# Patient Record
Sex: MALE | Race: WHITE | Employment: UNEMPLOYED | RURAL
[De-identification: names, ages, dates, MRNs, and addresses within clinical notes are randomized per-mention and may not be internally consistent; named-entity substitution may affect disease eponyms.]

---

## 2017-01-01 ENCOUNTER — OFFICE VISIT (OUTPATIENT)
Dept: CARDIOLOGY CLINIC | Age: 77
End: 2017-01-01

## 2017-01-01 ENCOUNTER — CLINICAL SUPPORT (OUTPATIENT)
Dept: CARDIOLOGY CLINIC | Age: 77
End: 2017-01-01

## 2017-01-01 VITALS
HEIGHT: 69 IN | RESPIRATION RATE: 18 BRPM | OXYGEN SATURATION: 99 % | SYSTOLIC BLOOD PRESSURE: 136 MMHG | DIASTOLIC BLOOD PRESSURE: 70 MMHG | HEART RATE: 75 BPM

## 2017-01-01 DIAGNOSIS — Z95.0 S/P CARDIAC PACEMAKER PROCEDURE: ICD-10-CM

## 2017-01-01 DIAGNOSIS — I48.19 PERSISTENT ATRIAL FIBRILLATION (HCC): ICD-10-CM

## 2017-01-01 DIAGNOSIS — Z95.0 S/P CARDIAC PACEMAKER PROCEDURE: Primary | ICD-10-CM

## 2017-01-01 DIAGNOSIS — I44.1 AV BLOCK, 2ND DEGREE: ICD-10-CM

## 2017-01-01 DIAGNOSIS — G71.01 BECKER'S MUSCULAR DYSTROPHY (HCC): ICD-10-CM

## 2017-01-01 DIAGNOSIS — I49.5 SICK SINUS SYNDROME (HCC): Primary | ICD-10-CM

## 2017-01-01 RX ORDER — SILVER SULFADIAZINE 10 G/1000G
CREAM TOPICAL AS NEEDED
COMMUNITY
End: 2018-01-01 | Stop reason: CLARIF

## 2017-01-01 RX ORDER — FACIAL-BODY WIPES
10 EACH TOPICAL AS NEEDED
COMMUNITY

## 2017-01-01 RX ORDER — POTASSIUM CHLORIDE 750 MG/1
10 CAPSULE, EXTENDED RELEASE ORAL DAILY
COMMUNITY

## 2017-01-01 RX ORDER — BISACODYL 5 MG
5 TABLET, DELAYED RELEASE (ENTERIC COATED) ORAL DAILY PRN
COMMUNITY

## 2017-01-01 RX ORDER — ADHESIVE BANDAGE
30 BANDAGE TOPICAL DAILY PRN
COMMUNITY

## 2017-01-01 RX ORDER — CHLORHEXIDINE GLUCONATE 1.2 MG/ML
15 RINSE ORAL DAILY
COMMUNITY

## 2017-01-01 RX ORDER — POLYETHYLENE GLYCOL 3350
POWDER (GRAM) MISCELLANEOUS AS NEEDED
COMMUNITY

## 2017-04-21 ENCOUNTER — OFFICE VISIT (OUTPATIENT)
Dept: CARDIOLOGY CLINIC | Age: 77
End: 2017-04-21

## 2017-04-21 ENCOUNTER — CLINICAL SUPPORT (OUTPATIENT)
Dept: CARDIOLOGY CLINIC | Age: 77
End: 2017-04-21

## 2017-04-21 VITALS
WEIGHT: 143 LBS | HEIGHT: 69 IN | OXYGEN SATURATION: 95 % | DIASTOLIC BLOOD PRESSURE: 70 MMHG | RESPIRATION RATE: 16 BRPM | BODY MASS INDEX: 21.18 KG/M2 | SYSTOLIC BLOOD PRESSURE: 118 MMHG | HEART RATE: 70 BPM

## 2017-04-21 DIAGNOSIS — R55 SYNCOPE AND COLLAPSE: ICD-10-CM

## 2017-04-21 DIAGNOSIS — I44.1 AV BLOCK, 2ND DEGREE: ICD-10-CM

## 2017-04-21 DIAGNOSIS — Z95.0 S/P CARDIAC PACEMAKER PROCEDURE: Primary | ICD-10-CM

## 2017-04-21 DIAGNOSIS — I48.19 PERSISTENT ATRIAL FIBRILLATION (HCC): ICD-10-CM

## 2017-04-21 DIAGNOSIS — G71.01 BECKER'S MUSCULAR DYSTROPHY (HCC): Primary | ICD-10-CM

## 2017-04-21 DIAGNOSIS — Z95.0 S/P CARDIAC PACEMAKER PROCEDURE: ICD-10-CM

## 2017-04-21 DIAGNOSIS — I49.5 SICK SINUS SYNDROME (HCC): ICD-10-CM

## 2017-04-21 RX ORDER — GUAIFENESIN 600 MG/1
600 TABLET, EXTENDED RELEASE ORAL 2 TIMES DAILY
COMMUNITY
End: 2017-01-01 | Stop reason: ALTCHOICE

## 2017-04-21 RX ORDER — ALBUTEROL SULFATE 90 UG/1
AEROSOL, METERED RESPIRATORY (INHALATION) 2 TIMES DAILY
COMMUNITY

## 2017-04-21 RX ORDER — DOCUSATE SODIUM 100 MG/1
100 CAPSULE, LIQUID FILLED ORAL AS NEEDED
COMMUNITY

## 2017-04-21 NOTE — PROGRESS NOTES
Verified patient with two patient identifiers. Medications reviewed/approved by Dr. Iza Boudreaux. Verbal from Dr. Iza Boudreaux to remove the medications that were deleted during the visit.

## 2017-04-21 NOTE — PROGRESS NOTES
Mi Mora. is a 77 y.o. male is here for routine f/u and PPM.  No specific CV sx or complaints. PPM check today with persistent afib since the fall. Had seen Dr. Audie Martini in Sept and with his MD and Carly 1, frequent falls he is not on anticoag. The patient denies chest pain/ shortness of breath, orthopnea, PND, LE edema, palpitations, syncope, presyncope or fatigue. 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 No Known Allergies   No family history on file. Social History     Social History    Marital status: SINGLE     Spouse name: N/A    Number of children: N/A    Years of education: N/A     Occupational History    Not on file. Social History Main Topics    Smoking status: Current Every Day Smoker     Types: Cigarettes    Smokeless tobacco: Never Used    Alcohol use No    Drug use: Not on file    Sexual activity: Not on file     Other Topics Concern    Not on file     Social History Narrative      Current Outpatient Prescriptions   Medication Sig    ADVAIR DISKUS 250-50 mcg/dose diskus inhaler Take 2 Inhalation by inhalation two (2) times a day.  ATROVENT HFA 17 mcg/actuation inhaler Take 2 Puffs by inhalation three (3) times daily.  predniSONE (DELTASONE) 10 mg tablet Take 10 mg by mouth daily.  furosemide (LASIX) 40 mg tablet Take 40 mg by mouth every three (3) days.  methocarbamol (ROBAXIN) 500 mg tablet Take  by mouth three (3) times daily as needed.     ALBUTEROL SULFATE IN Take  by inhalation. 2.5 MG/3 ML NEBULIZER AS NEEDED    cyclobenzaprine (FLEXERIL) 5 mg tablet Take 10 mg by mouth nightly. & AS NEEDED BID    cetirizine (ZYRTEC) 10 mg tablet Take  by mouth.  calcium-cholecalciferol, D3, (CALTRATE 600+D) tablet Take 1 Tab by mouth daily.  MAPAP, ACETAMINOPHEN, PO Take 650 mg by mouth as needed.  aspirin (ASPIRIN) 325 mg tablet Take 325 mg by mouth daily.  tamsulosin (FLOMAX) 0.4 mg capsule Take 0.4 mg by mouth daily. No current facility-administered medications for this visit. Review of Symptoms:    CONST  No weight change. No fever, chills, sweats    ENT No visual changes, URI sx, sore throat    CV  See HPI   RESP  No cough, or sputum, wheezing. Also see HPI   GI  No abdominal pain or change in bowel habits. No heartburn or dysphagia. No melena or rectal bleeding.   No dysuria, urgency, frequency, hematuria   MSKEL  No joint pain, swelling. No muscle pain. SKIN  No rash or lesions. NEURO  No headache, syncope, or seizure. No weakness, loss of sensation, or paresthesias. PSYCH  No low mood or depression  No anxiety. HE/LYMPH  No easy bruising, abnormal bleeding, or enlarged glands.         Physical ExamPhysical Exam:    Visit Vitals    Resp 16    Ht 5' 9\" (1.753 m)    Wt 143 lb (64.9 kg)    BMI 21.12 kg/m2     Gen: NAD  HEENT:  PERRL, throat clear  Neck: no mass or thyromegaly, no JVD   Heart:  Regular,Nl S1S2,  no murmur, gallop or rub.   Lungs:  clear  Abdomen:   Soft, non-tender, bowel sounds are active.   Extremities:  No edema  Pulse: symmetric  Neuro: A&O times 3, WNL      Cardiographics    ECG: underlying afib/flutter, V paced      Labs:   Lab Results   Component Value Date/Time    Sodium 141 05/25/2016 05:13 AM    Potassium 4.2 05/25/2016 05:13 AM    Chloride 109 05/25/2016 05:13 AM    CO2 20 05/25/2016 05:13 AM    Anion gap 12 05/25/2016 05:13 AM    Glucose 62 05/25/2016 05:13 AM    BUN 12 05/25/2016 05:13 AM    Creatinine 0.17 05/25/2016 05:13 AM    BUN/Creatinine ratio 71 05/25/2016 05:13 AM    GFR est AA >60 05/25/2016 05:13 AM    GFR est non-AA >60 05/25/2016 05:13 AM    Calcium 8.7 05/25/2016 05:13 AM     No results found for: CPK, CPKX, CPX  No results found for: CHOL, CHOLX, CHLST, CHOLV, 091059, HDL, LDL, DLDL, LDLC, DLDLP, TGL, TGLX, TRIGL, [id number], TRIGP, CHHD, CHHDX  No results found for this or any previous visit. Assessment:         Patient Active Problem List    Diagnosis Date Noted    Irregular heart beat 09/13/2016    Alonso's muscular dystrophy ([facility] [address] 06/08/2016    Persistent atrial fibrillation ([facility] [address] [facility] 05/26/2016    AV block, 2nd degree 05/25/2016    Syncope and collapse 05/25/2016    S/P cardiac pacemaker procedure 05/25/2016      No specific CV sx or complaints--feels great. No syncope. PPM check today with persistent afib since the fall. Had seen Dr. Ariadna Jefferson in Sept and with his MD and Carly 1, frequent falls he is not on anticoag. Plan:     Doing well with no adverse cardiac symptoms. Lipids and labs followed by PCP. Continue current care and f/u in 6 months, PPM recheck then.     Victoriano Escobar MD

## 2017-11-17 NOTE — PROGRESS NOTES
PATIENT ID VERIFIED WITH TWO PATIENT IDENTIFIERS. MEDICATION REVIEWED AND APPROVED BY DR. Jessica Beltran. Chief Complaint   Patient presents with    Irregular Heart Beat     6 MO /FU AND MEDTRONIC PACEMAKER CHECK    AICD problem     1. Have you been to the ER, urgent care clinic since your last visit? Hospitalized since your last visit? NO  2. Have you seen or consulted any other health care providers outside of the [address] [place] [address] since your last visit? Include any pap smears or colon screening. YES --ultrasound on legs for redness and edema. Done at [facility].

## 2017-11-17 NOTE — PROGRESS NOTES
Leatha Seay is a 77 y.o. male is here for routine f/u and PPM check. No CV sx or complaints. No recent syncope/other problems. PPM check today with persistent afib since the fall. Had seen EP previously and with his MD and Carly 1, frequent falls he is not on anticoag (ASA only). The patient denies chest pain/ shortness of breath, orthopnea, PND, LE edema, palpitations, syncope, presyncope or fatigue. Patient Active Problem List    Diagnosis Date Noted    Irregular heart beat 09/13/2016    Alonso's muscular dystrophy ([facility] [address] 06/08/2016    Persistent atrial fibrillation ([facility] 05/26/2016    AV block, 2nd degree 05/25/2016    Syncope and collapse 05/25/2016    S/P cardiac pacemaker procedure 05/25/2016      Endy Fajardo MD  Past Medical History:   Diagnosis Date    Atrial fibrillation [facility]) 5/26/2016    S/P cardiac pacemaker procedure 5/25/2016 5/25/16 Medtronic dual chamber pacemaker implant      No past surgical history on file. No Known Allergies   No family history on file. Social History     Social History    Marital status: SINGLE     Spouse name: N/A    Number of children: N/A    Years of education: N/A     Occupational History    Not on file. Social History Main Topics    Smoking status: Current Every Day Smoker     Types: Cigarettes    Smokeless tobacco: Never Used    Alcohol use No    Drug use: Not on file    Sexual activity: Not on file     Other Topics Concern    Not on file     Social History Narrative      Current Outpatient Prescriptions   Medication Sig    chlorhexidine (PERIDEX) 0.12 % solution 15 mL by Swish and Spit route daily.  potassium chloride SA (MICRO-K) 10 mEq capsule Take 10 mEq by mouth daily.  Polyethylene Glycol 3350 powd by Does Not Apply route as needed.  silver sulfADIAZINE (SILVADENE) 1 % topical cream Apply  to affected area as needed for Decubitis.     bisacodyl (DULCOLAX, BISACODYL,) 5 mg EC tablet Take 5 mg by mouth daily as needed for Constipation.  magnesium hydroxide (100e.com MILK OF MAGNESIA) 400 mg/5 mL suspension Take 30 mL by mouth daily as needed for Constipation.  bisacodyl (DULCOLAX, BISACODYL,) 10 mg suppository Insert 10 mg into rectum as needed.  docusate sodium (COLACE) 100 mg capsule Take 100 mg by mouth as needed for Constipation.  albuterol (PROAIR HFA) 90 mcg/actuation inhaler Take  by inhalation two (2) times a day.  ATROVENT HFA 17 mcg/actuation inhaler Take 2 Puffs by inhalation four (4) times daily.  predniSONE (DELTASONE) 10 mg tablet Take 10 mg by mouth daily.  furosemide (LASIX) 40 mg tablet Take 20 mg by mouth daily.  ALBUTEROL SULFATE IN Take  by inhalation. 2.5 MG/3 ML NEBULIZER AS NEEDED    cyclobenzaprine (FLEXERIL) 5 mg tablet Take 10 mg by mouth nightly. & AS NEEDED    cetirizine (ZYRTEC) 10 mg tablet Take  by mouth.  calcium-cholecalciferol, D3, (CALTRATE 600+D) tablet Take 1 Tab by mouth daily.  MAPAP, ACETAMINOPHEN, PO Take 325 mg by mouth as needed.  aspirin (ASPIRIN) 325 mg tablet Take 325 mg by mouth daily.  tamsulosin (FLOMAX) 0.4 mg capsule Take 0.4 mg by mouth daily. No current facility-administered medications for this visit. Review of Symptoms:    CONST  No weight change. No fever, chills, sweats    ENT No visual changes, URI sx, sore throat    CV  See HPI   RESP  No cough, or sputum, wheezing. Also see HPI   GI  No abdominal pain or change in bowel habits. No heartburn or dysphagia. No melena or rectal bleeding.   No dysuria, urgency, frequency, hematuria   MSKEL  No joint pain, swelling. No muscle pain. SKIN  No rash or lesions. NEURO  No headache, syncope, or seizure. No weakness, loss of sensation, or paresthesias. PSYCH  No low mood or depression  No anxiety. HE/LYMPH  No easy bruising, abnormal bleeding, or enlarged glands.         Physical ExamPhysical Exam:    Visit Vitals    /70 (BP 1 Location: Left arm, BP Patient Position: Sitting)    Pulse 75    Resp 18    Ht 5' 9\" (1.753 m)    SpO2 99%     Gen: NAD  HEENT:  PERRL, throat clear  Neck: no adenopathy, no thyromegaly, no JVD   Heart:  Regular,Nl S1S2,  I/Vi  I murmur, gallop or rub.   Lungs:  clear  Abdomen:   Soft, non-tender, bowel sounds are active.   Extremities:  mild edema  Pulse: symmetric  Neuro: A&O times 3, No focal neuro deficits    Cardiographics    ECG: afib/flutter, V paced    Labs:   Lab Results   Component Value Date/Time    Sodium 141 05/25/2016 05:13 AM    Potassium 4.2 05/25/2016 05:13 AM    Chloride 109 05/25/2016 05:13 AM    CO2 20 05/25/2016 05:13 AM    Anion gap 12 05/25/2016 05:13 AM    Glucose 62 05/25/2016 05:13 AM    BUN 12 05/25/2016 05:13 AM    Creatinine 0.17 05/25/2016 05:13 AM    BUN/Creatinine ratio 71 05/25/2016 05:13 AM    GFR est AA >60 05/25/2016 05:13 AM    GFR est non-AA >60 05/25/2016 05:13 AM    Calcium 8.7 05/25/2016 05:13 AM     No results found for: CPK, CPKX, CPX  No results found for: CHOL, CHOLX, CHLST, CHOLV, 686203, HDL, LDL, LDLC, DLDLP, TGLX, TRIGL, TRIGP, CHHD, CHHDX  No results found for this or any previous visit. Assessment:         Patient Active Problem List    Diagnosis Date Noted    Irregular heart beat 09/13/2016    Alonso's muscular dystrophy ([facility] [address] [address] 06/08/2016    Persistent atrial fibrillation ([facility] 05/26/2016    AV block, 2nd degree 05/25/2016    Syncope and collapse 05/25/2016    S/P cardiac pacemaker procedure 05/25/2016     Routine f/u and PPM check. No CV sx or complaints. No recent syncope/other problems. PPM check today with persistent afib since the fall. Had seen EP previously and with his MD and CHADs 1, frequent falls he is not on anticoag (ASA only). Plan:     Doing well with no adverse cardiac symptoms. Lipids and labs followed by PCP. Continue current care and f/u in 6 months.     Amparo Mota MD

## 2017-11-17 NOTE — MR AVS SNAPSHOT
Visit Information Date & Time Provider Department Dept. Phone Encounter #  
 11/17/2017 11:20 AM Rafael Mancini MD [facility] [phone] [id number] Follow-up Instructions Return in about 6 months (around 5/17/2018). Your Appointments 6/22/2018 10:20 AM  
ESTABLISHED PATIENT with Rafael Mancini MD  
[address] [facility] Appt Note: medtronic pacemaker check and 6 mo follow up $0cp [address] [id number] [phone]  
  
   
 [address] [place] [address] [address] [address] Upcoming Health Maintenance Date Due DTaP/Tdap/Td series (1 - Tdap) 12/24/1962 ZOSTER VACCINE AGE 60> 10/24/2001 GLAUCOMA SCREENING Q2Y 12/24/2006 Pneumococcal 65+ Low/Medium Risk (1 of 2 - PCV13) 12/24/2006 MEDICARE YEARLY EXAM 12/24/2006 Influenza Age 5 to Adult 8/1/2017 Allergies as of 11/17/2017  Review Complete On: 11/17/2017 By: Rafael Mancini MD  
 No Known Allergies Current Immunizations  Never Reviewed No immunizations on file. Not reviewed this visit You Were Diagnosed With   
  
 Codes Comments Sick sinus syndrome ([facility]    -  Primary ICD-10-CM: I49.5 ICD-9-CM: 427.81 Persistent atrial fibrillation (HCC)     ICD-10-CM: I48.1 ICD-9-CM: 427.31 S/P cardiac pacemaker procedure     ICD-10-CM: Z95.0 ICD-9-CM: V45.01 Alonso's muscular dystrophy ([facility]     ICD-10-CM: G71.0 ICD-9-CM: 359.22 Vitals BP Pulse Resp Height(growth percentile) SpO2 Smoking Status 136/70 (BP 1 Location: Left arm, BP Patient Position: Sitting) 75 18 5' 9\" (1.753 m) 99% Current Every Day Smoker Vitals History Your Updated Medication List  
  
   
This list is accurate as of: 11/17/17 11:48 AM.  Always use your most recent med list.  
  
  
  
  
 * ALBUTEROL SULFATE IN Take  by inhalation.  2.5 MG/3 ML NEBULIZER AS NEEDED  
  
 * PROAIR HFA 90 mcg/actuation inhaler Generic drug:  albuterol Take  by inhalation two (2) times a day. aspirin 325 mg tablet Commonly known as:  ASPIRIN Take 325 mg by mouth daily. ATROVENT HFA 17 mcg/actuation inhaler Generic drug:  ipratropium Take 2 Puffs by inhalation four (4) times daily. calcium-cholecalciferol (D3) tablet Commonly known as:  CALTRATE 600+D Take 1 Tab by mouth daily. cetirizine 10 mg tablet Commonly known as:  ZYRTEC Take  by mouth. cyclobenzaprine 5 mg tablet Commonly known as:  FLEXERIL Take 10 mg by mouth nightly. & AS NEEDED  
  
 docusate sodium 100 mg capsule Commonly known as:  Dorthevic Matsu Take 100 mg by mouth as needed for Constipation. * DULCOLAX (BISACODYL) 5 mg EC tablet Generic drug:  bisacodyl Take 5 mg by mouth daily as needed for Constipation. * DULCOLAX (BISACODYL) 10 mg suppository Generic drug:  bisacodyl Insert 10 mg into rectum as needed. furosemide 40 mg tablet Commonly known as:  LASIX Take 20 mg by mouth daily. MAPAP (ACETAMINOPHEN) PO Take 325 mg by mouth as needed. PERIDEX 0.12 % solution Generic drug:  chlorhexidine 15 mL by Swish and Spit route daily. VILLE[place]AS MILK OF MAGNESIA 400 mg/5 mL suspension Generic drug:  magnesium hydroxide Take 30 mL by mouth daily as needed for Constipation. Polyethylene Glycol 3350 Powd  
by Does Not Apply route as needed. potassium chloride SA 10 mEq capsule Commonly known as:  Sal Benders Take 10 mEq by mouth daily. predniSONE 10 mg tablet Commonly known as:  [place] Take 10 mg by mouth daily. SILVADENE 1 % topical cream  
Generic drug:  silver sulfADIAZINE Apply  to affected area as needed for Decubitis. tamsulosin 0.4 mg capsule Commonly known as:  FLOMAX Take 0.4 mg by mouth daily. * Notice:   This list has 4 medication(s) that are the same as other medications prescribed for you. Read the directions carefully, and ask your doctor or other care provider to review them with you. We Performed the Following AMB POC EKG ROUTINE W/ 12 LEADS, INTER & REP [32465 CPT(R)] Follow-up Instructions Return in about 6 months (around 5/17/2018). Introducing \[facility] [place] [facility]\"" & [facility]! Naomi Knapp introduces [employer] patient portal. Now you can access parts of your medical record, email your doctor's office, and request medication refills online. 1. In your internet browser, go to [email or web address] 2. Click on the First Time User? Click Here link in the Sign In box. You will see the New Member Sign Up page. 3. Enter your [employer] Access Code exactly as it appears below. You will not need to use this code after youve completed the sign-up process. If you do not sign up before the expiration date, you must request a new code. · [employer] Access Code: [email or web address] Expires: 2/15/2018 11:48 AM 
 
4. Enter the last four digits of your Social Security Number (xxxx) and Date of Birth (mm/dd/yyyy) as indicated and click Submit. You will be taken to the next sign-up page. 5. Create a [employer] ID. This will be your [employer] login ID and cannot be changed, so think of one that is secure and easy to remember. 6. Create a [employer] password. You can change your password at any time. 7. Enter your Password Reset Question and Answer. This can be used at a later time if you forget your password. 8. Enter your e-mail address. You will receive e-mail notification when new information is available in [address] 9. Click Sign Up. You can now view and download portions of your medical record. 10. Click the Download Summary menu link to download a portable copy of your medical information. If you have questions, please visit the Frequently Asked Questions section of the [employer] website.  Remember, [employer] is NOT to be used for urgent needs. For medical emergencies, dial 911. Now available from your iPhone and Android! Please provide this summary of care documentation to your next provider. Your primary care clinician is listed as Victoriano Rainey. If you have any questions after today's visit, please call [phone].

## 2018-01-01 ENCOUNTER — HOSPITAL ENCOUNTER (INPATIENT)
Age: 78
LOS: 3 days | Discharge: NURSING, REHAB OR LONG-TERM CARE | DRG: 475 | End: 2018-06-11
Attending: SURGERY | Admitting: SURGERY
Payer: MEDICARE

## 2018-01-01 ENCOUNTER — ANESTHESIA (OUTPATIENT)
Dept: SURGERY | Age: 78
DRG: 475 | End: 2018-01-01
Payer: MEDICARE

## 2018-01-01 ENCOUNTER — ANESTHESIA EVENT (OUTPATIENT)
Dept: SURGERY | Age: 78
DRG: 475 | End: 2018-01-01
Payer: MEDICARE

## 2018-01-01 VITALS
HEIGHT: 70 IN | BODY MASS INDEX: 21.46 KG/M2 | DIASTOLIC BLOOD PRESSURE: 50 MMHG | RESPIRATION RATE: 18 BRPM | HEART RATE: 60 BPM | OXYGEN SATURATION: 95 % | SYSTOLIC BLOOD PRESSURE: 147 MMHG | WEIGHT: 149.91 LBS | TEMPERATURE: 98.5 F

## 2018-01-01 DIAGNOSIS — M86.172 OSTEOMYELITIS OF FOOT, LEFT, ACUTE (HCC): Primary | ICD-10-CM

## 2018-01-01 LAB
ANION GAP BLD CALC-SCNC: 12 MMOL/L (ref 10–20)
ANION GAP BLD CALC-SCNC: 15 MMOL/L (ref 10–20)
ANION GAP SERPL CALC-SCNC: 6 MMOL/L (ref 5–15)
APPEARANCE UR: CLEAR
ATRIAL RATE: 61 BPM
BACTERIA URNS QL MICRO: NEGATIVE /HPF
BILIRUB UR QL: NEGATIVE
BUN BLD-MCNC: 12 MG/DL (ref 9–20)
BUN BLD-MCNC: 17 MG/DL (ref 9–20)
BUN SERPL-MCNC: 4 MG/DL (ref 6–20)
BUN/CREAT SERPL: 11 (ref 12–20)
CA-I BLD-MCNC: 0.99 MMOL/L (ref 1.12–1.32)
CA-I BLD-MCNC: 1.01 MMOL/L (ref 1.12–1.32)
CALCIUM SERPL-MCNC: 8.5 MG/DL (ref 8.5–10.1)
CALCULATED R AXIS, ECG10: -71 DEGREES
CALCULATED T AXIS, ECG11: 105 DEGREES
CHLORIDE BLD-SCNC: 97 MMOL/L (ref 98–107)
CHLORIDE BLD-SCNC: 98 MMOL/L (ref 98–107)
CHLORIDE SERPL-SCNC: 101 MMOL/L (ref 97–108)
CO2 BLD-SCNC: 32 MMOL/L (ref 21–32)
CO2 BLD-SCNC: 36 MMOL/L (ref 21–32)
CO2 SERPL-SCNC: 31 MMOL/L (ref 21–32)
COLOR UR: NORMAL
CREAT BLD-MCNC: 0.3 MG/DL (ref 0.6–1.3)
CREAT BLD-MCNC: 0.3 MG/DL (ref 0.6–1.3)
CREAT SERPL-MCNC: 0.36 MG/DL (ref 0.7–1.3)
DIAGNOSIS, 93000: NORMAL
EPITH CASTS URNS QL MICRO: NORMAL /LPF
ERYTHROCYTE [DISTWIDTH] IN BLOOD BY AUTOMATED COUNT: 15.4 % (ref 11.5–14.5)
GLUCOSE BLD-MCNC: 142 MG/DL (ref 65–100)
GLUCOSE BLD-MCNC: 146 MG/DL (ref 65–100)
GLUCOSE SERPL-MCNC: 145 MG/DL (ref 65–100)
GLUCOSE UR STRIP.AUTO-MCNC: NEGATIVE MG/DL
HCT VFR BLD AUTO: 42.9 % (ref 36.6–50.3)
HCT VFR BLD CALC: 47 % (ref 36.6–50.3)
HCT VFR BLD CALC: 51 % (ref 36.6–50.3)
HGB BLD-MCNC: 13.5 G/DL (ref 12.1–17)
HGB UR QL STRIP: NEGATIVE
HYALINE CASTS URNS QL MICRO: NORMAL /LPF (ref 0–5)
KETONES UR QL STRIP.AUTO: NEGATIVE MG/DL
LEUKOCYTE ESTERASE UR QL STRIP.AUTO: NEGATIVE
MCH RBC QN AUTO: 28.7 PG (ref 26–34)
MCHC RBC AUTO-ENTMCNC: 31.5 G/DL (ref 30–36.5)
MCV RBC AUTO: 91.3 FL (ref 80–99)
NITRITE UR QL STRIP.AUTO: NEGATIVE
NRBC # BLD: 0 K/UL (ref 0–0.01)
NRBC BLD-RTO: 0 PER 100 WBC
PH UR STRIP: 6.5 [PH] (ref 5–8)
PLATELET # BLD AUTO: 189 K/UL (ref 150–400)
PMV BLD AUTO: 10.8 FL (ref 8.9–12.9)
POTASSIUM BLD-SCNC: 3.2 MMOL/L (ref 3.5–5.1)
POTASSIUM BLD-SCNC: 7.1 MMOL/L (ref 3.5–5.1)
POTASSIUM SERPL-SCNC: 3.2 MMOL/L (ref 3.5–5.1)
POTASSIUM SERPL-SCNC: 3.4 MMOL/L (ref 3.5–5.1)
PROT UR STRIP-MCNC: NEGATIVE MG/DL
Q-T INTERVAL, ECG07: 520 MS
QRS DURATION, ECG06: 162 MS
QTC CALCULATION (BEZET), ECG08: 523 MS
RBC # BLD AUTO: 4.7 M/UL (ref 4.1–5.7)
RBC #/AREA URNS HPF: NORMAL /HPF (ref 0–5)
SERVICE CMNT-IMP: ABNORMAL
SERVICE CMNT-IMP: ABNORMAL
SODIUM BLD-SCNC: 137 MMOL/L (ref 136–145)
SODIUM BLD-SCNC: 140 MMOL/L (ref 136–145)
SODIUM SERPL-SCNC: 138 MMOL/L (ref 136–145)
SP GR UR REFRACTOMETRY: 1.01 (ref 1–1.03)
UA: UC IF INDICATED,UAUC: NORMAL
UROBILINOGEN UR QL STRIP.AUTO: 1 EU/DL (ref 0.2–1)
VENTRICULAR RATE, ECG03: 61 BPM
WBC # BLD AUTO: 14.3 K/UL (ref 4.1–11.1)
WBC URNS QL MICRO: NORMAL /HPF (ref 0–4)

## 2018-01-01 PROCEDURE — 74011250637 HC RX REV CODE- 250/637: Performed by: SURGERY

## 2018-01-01 PROCEDURE — 74011250636 HC RX REV CODE- 250/636: Performed by: SURGERY

## 2018-01-01 PROCEDURE — 77030011992 HC AIRWY NASOPHGL TELE -A: Performed by: ANESTHESIOLOGY

## 2018-01-01 PROCEDURE — 65660000000 HC RM CCU STEPDOWN

## 2018-01-01 PROCEDURE — 74011636637 HC RX REV CODE- 636/637: Performed by: SURGERY

## 2018-01-01 PROCEDURE — 77030008463 HC STPLR SKN PROX J&J -B: Performed by: SURGERY

## 2018-01-01 PROCEDURE — 74011000250 HC RX REV CODE- 250: Performed by: SURGERY

## 2018-01-01 PROCEDURE — 77030002996 HC SUT SLK J&J -A: Performed by: SURGERY

## 2018-01-01 PROCEDURE — 77030002888 HC SUT CHRMC J&J -A: Performed by: SURGERY

## 2018-01-01 PROCEDURE — 76060000033 HC ANESTHESIA 1 TO 1.5 HR: Performed by: SURGERY

## 2018-01-01 PROCEDURE — 80048 BASIC METABOLIC PNL TOTAL CA: CPT | Performed by: SURGERY

## 2018-01-01 PROCEDURE — 93005 ELECTROCARDIOGRAM TRACING: CPT

## 2018-01-01 PROCEDURE — 77030031139 HC SUT VCRL2 J&J -A: Performed by: SURGERY

## 2018-01-01 PROCEDURE — 74011250636 HC RX REV CODE- 250/636

## 2018-01-01 PROCEDURE — 77030011640 HC PAD GRND REM COVD -A: Performed by: SURGERY

## 2018-01-01 PROCEDURE — 74011250636 HC RX REV CODE- 250/636: Performed by: ANESTHESIOLOGY

## 2018-01-01 PROCEDURE — 76210000000 HC OR PH I REC 2 TO 2.5 HR: Performed by: SURGERY

## 2018-01-01 PROCEDURE — P9045 ALBUMIN (HUMAN), 5%, 250 ML: HCPCS

## 2018-01-01 PROCEDURE — 77010033678 HC OXYGEN DAILY

## 2018-01-01 PROCEDURE — 84132 ASSAY OF SERUM POTASSIUM: CPT | Performed by: SURGERY

## 2018-01-01 PROCEDURE — 77030020143 HC AIRWY LARYN INTUB CGAS -A: Performed by: ANESTHESIOLOGY

## 2018-01-01 PROCEDURE — 36415 COLL VENOUS BLD VENIPUNCTURE: CPT | Performed by: SURGERY

## 2018-01-01 PROCEDURE — 77030018836 HC SOL IRR NACL ICUM -A: Performed by: SURGERY

## 2018-01-01 PROCEDURE — 88307 TISSUE EXAM BY PATHOLOGIST: CPT | Performed by: SURGERY

## 2018-01-01 PROCEDURE — 76010000149 HC OR TIME 1 TO 1.5 HR: Performed by: SURGERY

## 2018-01-01 PROCEDURE — 74011000272 HC RX REV CODE- 272: Performed by: SURGERY

## 2018-01-01 PROCEDURE — 88311 DECALCIFY TISSUE: CPT | Performed by: SURGERY

## 2018-01-01 PROCEDURE — 0Y6D0Z3 DETACHMENT AT LEFT UPPER LEG, LOW, OPEN APPROACH: ICD-10-PCS | Performed by: SURGERY

## 2018-01-01 PROCEDURE — 85027 COMPLETE CBC AUTOMATED: CPT | Performed by: SURGERY

## 2018-01-01 PROCEDURE — 77030038391 HC BLD SAW GIGLI SKLR -B: Performed by: SURGERY

## 2018-01-01 PROCEDURE — 80047 BASIC METABLC PNL IONIZED CA: CPT

## 2018-01-01 PROCEDURE — 77030020782 HC GWN BAIR PAWS FLX 3M -B

## 2018-01-01 PROCEDURE — 81001 URINALYSIS AUTO W/SCOPE: CPT | Performed by: SURGERY

## 2018-01-01 RX ORDER — ALBUMIN HUMAN 50 G/1000ML
SOLUTION INTRAVENOUS AS NEEDED
Status: DISCONTINUED | OUTPATIENT
Start: 2018-01-01 | End: 2018-01-01 | Stop reason: HOSPADM

## 2018-01-01 RX ORDER — MIDAZOLAM HYDROCHLORIDE 1 MG/ML
0.5 INJECTION, SOLUTION INTRAMUSCULAR; INTRAVENOUS
Status: DISCONTINUED | OUTPATIENT
Start: 2018-01-01 | End: 2018-01-01 | Stop reason: HOSPADM

## 2018-01-01 RX ORDER — SODIUM CHLORIDE, SODIUM LACTATE, POTASSIUM CHLORIDE, CALCIUM CHLORIDE 600; 310; 30; 20 MG/100ML; MG/100ML; MG/100ML; MG/100ML
25 INJECTION, SOLUTION INTRAVENOUS CONTINUOUS
Status: DISCONTINUED | OUTPATIENT
Start: 2018-01-01 | End: 2018-01-01 | Stop reason: HOSPADM

## 2018-01-01 RX ORDER — CEFAZOLIN SODIUM/WATER 2 G/20 ML
2 SYRINGE (ML) INTRAVENOUS ONCE
Status: DISCONTINUED | OUTPATIENT
Start: 2018-01-01 | End: 2018-01-01

## 2018-01-01 RX ORDER — MORPHINE SULFATE 10 MG/ML
2 INJECTION, SOLUTION INTRAMUSCULAR; INTRAVENOUS
Status: DISCONTINUED | OUTPATIENT
Start: 2018-01-01 | End: 2018-01-01 | Stop reason: HOSPADM

## 2018-01-01 RX ORDER — ONDANSETRON 2 MG/ML
4 INJECTION INTRAMUSCULAR; INTRAVENOUS AS NEEDED
Status: DISCONTINUED | OUTPATIENT
Start: 2018-01-01 | End: 2018-01-01 | Stop reason: HOSPADM

## 2018-01-01 RX ORDER — FENTANYL CITRATE 50 UG/ML
25 INJECTION, SOLUTION INTRAMUSCULAR; INTRAVENOUS
Status: COMPLETED | OUTPATIENT
Start: 2018-01-01 | End: 2018-01-01

## 2018-01-01 RX ORDER — ALBUTEROL SULFATE 90 UG/1
2 AEROSOL, METERED RESPIRATORY (INHALATION)
Status: DISCONTINUED | OUTPATIENT
Start: 2018-01-01 | End: 2018-01-01 | Stop reason: HOSPADM

## 2018-01-01 RX ORDER — FUROSEMIDE 20 MG/1
20 TABLET ORAL DAILY
Status: DISCONTINUED | OUTPATIENT
Start: 2018-01-01 | End: 2018-01-01 | Stop reason: HOSPADM

## 2018-01-01 RX ORDER — POTASSIUM CHLORIDE AND SODIUM CHLORIDE 450; 150 MG/100ML; MG/100ML
INJECTION, SOLUTION INTRAVENOUS CONTINUOUS
Status: DISCONTINUED | OUTPATIENT
Start: 2018-01-01 | End: 2018-01-01

## 2018-01-01 RX ORDER — SODIUM CHLORIDE, SODIUM LACTATE, POTASSIUM CHLORIDE, CALCIUM CHLORIDE 600; 310; 30; 20 MG/100ML; MG/100ML; MG/100ML; MG/100ML
100 INJECTION, SOLUTION INTRAVENOUS CONTINUOUS
Status: DISCONTINUED | OUTPATIENT
Start: 2018-01-01 | End: 2018-01-01 | Stop reason: HOSPADM

## 2018-01-01 RX ORDER — FENTANYL CITRATE 50 UG/ML
50 INJECTION, SOLUTION INTRAMUSCULAR; INTRAVENOUS AS NEEDED
Status: DISCONTINUED | OUTPATIENT
Start: 2018-01-01 | End: 2018-01-01 | Stop reason: HOSPADM

## 2018-01-01 RX ORDER — EPINEPHRINE 1 MG/ML
INJECTION, SOLUTION, CONCENTRATE INTRAVENOUS AS NEEDED
Status: DISCONTINUED | OUTPATIENT
Start: 2018-01-01 | End: 2018-01-01 | Stop reason: HOSPADM

## 2018-01-01 RX ORDER — CEFAZOLIN SODIUM/WATER 2 G/20 ML
2 SYRINGE (ML) INTRAVENOUS EVERY 8 HOURS
Status: COMPLETED | OUTPATIENT
Start: 2018-01-01 | End: 2018-01-01

## 2018-01-01 RX ORDER — SODIUM CHLORIDE 9 MG/ML
50 INJECTION, SOLUTION INTRAVENOUS CONTINUOUS
Status: DISCONTINUED | OUTPATIENT
Start: 2018-01-01 | End: 2018-01-01

## 2018-01-01 RX ORDER — PREDNISONE 5 MG/1
5 TABLET ORAL DAILY
Status: DISCONTINUED | OUTPATIENT
Start: 2018-01-01 | End: 2018-01-01 | Stop reason: HOSPADM

## 2018-01-01 RX ORDER — SODIUM CHLORIDE 9 MG/ML
25 INJECTION, SOLUTION INTRAVENOUS CONTINUOUS
Status: DISCONTINUED | OUTPATIENT
Start: 2018-01-01 | End: 2018-01-01 | Stop reason: HOSPADM

## 2018-01-01 RX ORDER — PROPOFOL 10 MG/ML
INJECTION, EMULSION INTRAVENOUS AS NEEDED
Status: DISCONTINUED | OUTPATIENT
Start: 2018-01-01 | End: 2018-01-01 | Stop reason: HOSPADM

## 2018-01-01 RX ORDER — POTASSIUM CHLORIDE 750 MG/1
10 TABLET, FILM COATED, EXTENDED RELEASE ORAL DAILY
Status: DISCONTINUED | OUTPATIENT
Start: 2018-01-01 | End: 2018-01-01 | Stop reason: HOSPADM

## 2018-01-01 RX ORDER — FENTANYL CITRATE 50 UG/ML
INJECTION, SOLUTION INTRAMUSCULAR; INTRAVENOUS AS NEEDED
Status: DISCONTINUED | OUTPATIENT
Start: 2018-01-01 | End: 2018-01-01 | Stop reason: HOSPADM

## 2018-01-01 RX ORDER — SODIUM CHLORIDE, SODIUM LACTATE, POTASSIUM CHLORIDE, CALCIUM CHLORIDE 600; 310; 30; 20 MG/100ML; MG/100ML; MG/100ML; MG/100ML
INJECTION, SOLUTION INTRAVENOUS
Status: DISCONTINUED | OUTPATIENT
Start: 2018-01-01 | End: 2018-01-01 | Stop reason: HOSPADM

## 2018-01-01 RX ORDER — LIDOCAINE HYDROCHLORIDE 10 MG/ML
0.1 INJECTION, SOLUTION EPIDURAL; INFILTRATION; INTRACAUDAL; PERINEURAL AS NEEDED
Status: DISCONTINUED | OUTPATIENT
Start: 2018-01-01 | End: 2018-01-01 | Stop reason: HOSPADM

## 2018-01-01 RX ORDER — MORPHINE SULFATE 4 MG/ML
4 INJECTION, SOLUTION INTRAMUSCULAR; INTRAVENOUS
Status: DISCONTINUED | OUTPATIENT
Start: 2018-01-01 | End: 2018-01-01 | Stop reason: SDUPTHER

## 2018-01-01 RX ORDER — CEFAZOLIN SODIUM 1 G/3ML
2 INJECTION, POWDER, FOR SOLUTION INTRAMUSCULAR; INTRAVENOUS ONCE
Status: DISCONTINUED | OUTPATIENT
Start: 2018-01-01 | End: 2018-01-01 | Stop reason: SDUPTHER

## 2018-01-01 RX ORDER — GABAPENTIN 100 MG/1
100 CAPSULE ORAL 3 TIMES DAILY
COMMUNITY

## 2018-01-01 RX ORDER — SODIUM CHLORIDE 0.9 % (FLUSH) 0.9 %
5-10 SYRINGE (ML) INJECTION AS NEEDED
Status: DISCONTINUED | OUTPATIENT
Start: 2018-01-01 | End: 2018-01-01 | Stop reason: HOSPADM

## 2018-01-01 RX ORDER — MORPHINE SULFATE 10 MG/ML
4 INJECTION, SOLUTION INTRAMUSCULAR; INTRAVENOUS
Status: DISCONTINUED | OUTPATIENT
Start: 2018-01-01 | End: 2018-01-01 | Stop reason: HOSPADM

## 2018-01-01 RX ORDER — IBUPROFEN 200 MG
1 TABLET ORAL DAILY
Status: DISCONTINUED | OUTPATIENT
Start: 2018-01-01 | End: 2018-01-01 | Stop reason: HOSPADM

## 2018-01-01 RX ORDER — DIPHENHYDRAMINE HYDROCHLORIDE 50 MG/ML
12.5 INJECTION, SOLUTION INTRAMUSCULAR; INTRAVENOUS AS NEEDED
Status: DISCONTINUED | OUTPATIENT
Start: 2018-01-01 | End: 2018-01-01 | Stop reason: HOSPADM

## 2018-01-01 RX ORDER — ROPIVACAINE HYDROCHLORIDE 5 MG/ML
30 INJECTION, SOLUTION EPIDURAL; INFILTRATION; PERINEURAL AS NEEDED
Status: DISCONTINUED | OUTPATIENT
Start: 2018-01-01 | End: 2018-01-01 | Stop reason: HOSPADM

## 2018-01-01 RX ORDER — ACETAMINOPHEN 325 MG/1
650 TABLET ORAL
Status: DISCONTINUED | OUTPATIENT
Start: 2018-01-01 | End: 2018-01-01 | Stop reason: HOSPADM

## 2018-01-01 RX ORDER — SODIUM CHLORIDE 0.9 % (FLUSH) 0.9 %
5-10 SYRINGE (ML) INJECTION EVERY 8 HOURS
Status: DISCONTINUED | OUTPATIENT
Start: 2018-01-01 | End: 2018-01-01 | Stop reason: HOSPADM

## 2018-01-01 RX ORDER — MIDAZOLAM HYDROCHLORIDE 1 MG/ML
INJECTION, SOLUTION INTRAMUSCULAR; INTRAVENOUS AS NEEDED
Status: DISCONTINUED | OUTPATIENT
Start: 2018-01-01 | End: 2018-01-01 | Stop reason: HOSPADM

## 2018-01-01 RX ORDER — ONDANSETRON 2 MG/ML
4 INJECTION INTRAMUSCULAR; INTRAVENOUS
Status: DISCONTINUED | OUTPATIENT
Start: 2018-01-01 | End: 2018-01-01 | Stop reason: HOSPADM

## 2018-01-01 RX ORDER — MIDAZOLAM HYDROCHLORIDE 1 MG/ML
1 INJECTION, SOLUTION INTRAMUSCULAR; INTRAVENOUS AS NEEDED
Status: DISCONTINUED | OUTPATIENT
Start: 2018-01-01 | End: 2018-01-01 | Stop reason: HOSPADM

## 2018-01-01 RX ORDER — CYCLOBENZAPRINE HCL 10 MG
10 TABLET ORAL
Status: DISCONTINUED | OUTPATIENT
Start: 2018-01-01 | End: 2018-01-01 | Stop reason: HOSPADM

## 2018-01-01 RX ORDER — FACIAL-BODY WIPES
10 EACH TOPICAL DAILY PRN
Status: DISCONTINUED | OUTPATIENT
Start: 2018-01-01 | End: 2018-01-01 | Stop reason: HOSPADM

## 2018-01-01 RX ORDER — TAMSULOSIN HYDROCHLORIDE 0.4 MG/1
0.4 CAPSULE ORAL DAILY
Status: DISCONTINUED | OUTPATIENT
Start: 2018-01-01 | End: 2018-01-01 | Stop reason: HOSPADM

## 2018-01-01 RX ORDER — ADHESIVE BANDAGE
30 BANDAGE TOPICAL DAILY PRN
Status: DISCONTINUED | OUTPATIENT
Start: 2018-01-01 | End: 2018-01-01 | Stop reason: HOSPADM

## 2018-01-01 RX ORDER — OXYCODONE AND ACETAMINOPHEN 5; 325 MG/1; MG/1
1 TABLET ORAL
Status: ON HOLD | COMMUNITY
End: 2018-01-01

## 2018-01-01 RX ORDER — DOCUSATE SODIUM 100 MG/1
100 CAPSULE, LIQUID FILLED ORAL 2 TIMES DAILY
Status: DISCONTINUED | OUTPATIENT
Start: 2018-01-01 | End: 2018-01-01 | Stop reason: HOSPADM

## 2018-01-01 RX ORDER — HYDROMORPHONE HYDROCHLORIDE 1 MG/ML
0.5 INJECTION, SOLUTION INTRAMUSCULAR; INTRAVENOUS; SUBCUTANEOUS
Status: DISCONTINUED | OUTPATIENT
Start: 2018-01-01 | End: 2018-01-01 | Stop reason: HOSPADM

## 2018-01-01 RX ORDER — OXYCODONE AND ACETAMINOPHEN 5; 325 MG/1; MG/1
1 TABLET ORAL
Qty: 40 TAB | Refills: 0 | Status: SHIPPED | OUTPATIENT
Start: 2018-01-01

## 2018-01-01 RX ORDER — GABAPENTIN 100 MG/1
100 CAPSULE ORAL 3 TIMES DAILY
Status: DISCONTINUED | OUTPATIENT
Start: 2018-01-01 | End: 2018-01-01 | Stop reason: HOSPADM

## 2018-01-01 RX ORDER — PROPOFOL 10 MG/ML
INJECTION, EMULSION INTRAVENOUS
Status: DISCONTINUED | OUTPATIENT
Start: 2018-01-01 | End: 2018-01-01 | Stop reason: HOSPADM

## 2018-01-01 RX ORDER — OXYCODONE AND ACETAMINOPHEN 5; 325 MG/1; MG/1
1 TABLET ORAL
Status: DISCONTINUED | OUTPATIENT
Start: 2018-01-01 | End: 2018-01-01 | Stop reason: HOSPADM

## 2018-01-01 RX ORDER — OXYCODONE AND ACETAMINOPHEN 5; 325 MG/1; MG/1
2 TABLET ORAL
Status: DISCONTINUED | OUTPATIENT
Start: 2018-01-01 | End: 2018-01-01 | Stop reason: HOSPADM

## 2018-01-01 RX ORDER — MORPHINE SULFATE 2 MG/ML
2 INJECTION, SOLUTION INTRAMUSCULAR; INTRAVENOUS
Status: DISCONTINUED | OUTPATIENT
Start: 2018-01-01 | End: 2018-01-01 | Stop reason: SDUPTHER

## 2018-01-01 RX ADMIN — Medication 2 G: at 20:13

## 2018-01-01 RX ADMIN — IPRATROPIUM BROMIDE 2 PUFF: 17 AEROSOL, METERED RESPIRATORY (INHALATION) at 09:07

## 2018-01-01 RX ADMIN — MORPHINE SULFATE 2 MG: 10 INJECTION INTRAMUSCULAR; INTRAVENOUS; SUBCUTANEOUS at 13:28

## 2018-01-01 RX ADMIN — MORPHINE SULFATE 4 MG: 10 INJECTION INTRAMUSCULAR; INTRAVENOUS; SUBCUTANEOUS at 03:53

## 2018-01-01 RX ADMIN — SODIUM CHLORIDE, POTASSIUM CHLORIDE, SODIUM LACTATE AND CALCIUM CHLORIDE 25 ML/HR: 600; 310; 30; 20 INJECTION, SOLUTION INTRAVENOUS at 10:00

## 2018-01-01 RX ADMIN — SODIUM CHLORIDE, SODIUM LACTATE, POTASSIUM CHLORIDE, CALCIUM CHLORIDE: 600; 310; 30; 20 INJECTION, SOLUTION INTRAVENOUS at 10:47

## 2018-01-01 RX ADMIN — EPINEPHRINE 0.01 MG: 1 INJECTION, SOLUTION, CONCENTRATE INTRAVENOUS at 11:05

## 2018-01-01 RX ADMIN — POTASSIUM CHLORIDE 10 MEQ: 750 TABLET, FILM COATED, EXTENDED RELEASE ORAL at 09:05

## 2018-01-01 RX ADMIN — PREDNISONE 5 MG: 5 TABLET ORAL at 09:05

## 2018-01-01 RX ADMIN — IPRATROPIUM BROMIDE 2 PUFF: 17 AEROSOL, METERED RESPIRATORY (INHALATION) at 10:04

## 2018-01-01 RX ADMIN — IPRATROPIUM BROMIDE 2 PUFF: 17 AEROSOL, METERED RESPIRATORY (INHALATION) at 22:46

## 2018-01-01 RX ADMIN — FENTANYL CITRATE 50 MCG: 50 INJECTION, SOLUTION INTRAMUSCULAR; INTRAVENOUS at 12:27

## 2018-01-01 RX ADMIN — SODIUM CHLORIDE 50 ML/HR: 900 INJECTION, SOLUTION INTRAVENOUS at 06:28

## 2018-01-01 RX ADMIN — GABAPENTIN 100 MG: 100 CAPSULE ORAL at 15:36

## 2018-01-01 RX ADMIN — FENTANYL CITRATE 25 MCG: 50 INJECTION, SOLUTION INTRAMUSCULAR; INTRAVENOUS at 12:57

## 2018-01-01 RX ADMIN — POTASSIUM CHLORIDE 10 MEQ: 750 TABLET, FILM COATED, EXTENDED RELEASE ORAL at 10:13

## 2018-01-01 RX ADMIN — MORPHINE SULFATE 2 MG: 10 INJECTION INTRAMUSCULAR; INTRAVENOUS; SUBCUTANEOUS at 15:36

## 2018-01-01 RX ADMIN — ACETAMINOPHEN 650 MG: 325 TABLET ORAL at 22:56

## 2018-01-01 RX ADMIN — FENTANYL CITRATE 25 MCG: 50 INJECTION, SOLUTION INTRAMUSCULAR; INTRAVENOUS at 12:49

## 2018-01-01 RX ADMIN — MIDAZOLAM HYDROCHLORIDE 2 MG: 1 INJECTION, SOLUTION INTRAMUSCULAR; INTRAVENOUS at 10:53

## 2018-01-01 RX ADMIN — OXYCODONE HYDROCHLORIDE AND ACETAMINOPHEN 2 TABLET: 5; 325 TABLET ORAL at 09:23

## 2018-01-01 RX ADMIN — PROPOFOL 100 MCG/KG/MIN: 10 INJECTION, EMULSION INTRAVENOUS at 10:53

## 2018-01-01 RX ADMIN — FUROSEMIDE 20 MG: 20 TABLET ORAL at 09:05

## 2018-01-01 RX ADMIN — SODIUM CHLORIDE 50 ML/HR: 900 INJECTION, SOLUTION INTRAVENOUS at 12:35

## 2018-01-01 RX ADMIN — GABAPENTIN 100 MG: 100 CAPSULE ORAL at 10:12

## 2018-01-01 RX ADMIN — EPINEPHRINE 0.01 MG: 1 INJECTION, SOLUTION, CONCENTRATE INTRAVENOUS at 11:29

## 2018-01-01 RX ADMIN — SODIUM CHLORIDE, SODIUM LACTATE, POTASSIUM CHLORIDE, CALCIUM CHLORIDE: 600; 310; 30; 20 INJECTION, SOLUTION INTRAVENOUS at 11:47

## 2018-01-01 RX ADMIN — POTASSIUM CHLORIDE AND SODIUM CHLORIDE 1000 ML: 450; 150 INJECTION, SOLUTION INTRAVENOUS at 03:47

## 2018-01-01 RX ADMIN — FUROSEMIDE 20 MG: 20 TABLET ORAL at 10:13

## 2018-01-01 RX ADMIN — POTASSIUM CHLORIDE AND SODIUM CHLORIDE: 450; 150 INJECTION, SOLUTION INTRAVENOUS at 17:54

## 2018-01-01 RX ADMIN — MORPHINE SULFATE 4 MG: 10 INJECTION INTRAMUSCULAR; INTRAVENOUS; SUBCUTANEOUS at 10:11

## 2018-01-01 RX ADMIN — MORPHINE SULFATE 2 MG: 10 INJECTION INTRAMUSCULAR; INTRAVENOUS; SUBCUTANEOUS at 14:13

## 2018-01-01 RX ADMIN — POTASSIUM CHLORIDE 10 MEQ: 750 TABLET, FILM COATED, EXTENDED RELEASE ORAL at 08:26

## 2018-01-01 RX ADMIN — POTASSIUM CHLORIDE AND SODIUM CHLORIDE: 450; 150 INJECTION, SOLUTION INTRAVENOUS at 11:45

## 2018-01-01 RX ADMIN — DOCUSATE SODIUM 100 MG: 100 CAPSULE, LIQUID FILLED ORAL at 08:26

## 2018-01-01 RX ADMIN — GABAPENTIN 100 MG: 100 CAPSULE ORAL at 22:22

## 2018-01-01 RX ADMIN — CYCLOBENZAPRINE HYDROCHLORIDE 10 MG: 10 TABLET, FILM COATED ORAL at 21:25

## 2018-01-01 RX ADMIN — MORPHINE SULFATE 2 MG: 10 INJECTION INTRAMUSCULAR; INTRAVENOUS; SUBCUTANEOUS at 18:00

## 2018-01-01 RX ADMIN — MORPHINE SULFATE 2 MG: 10 INJECTION INTRAMUSCULAR; INTRAVENOUS; SUBCUTANEOUS at 13:38

## 2018-01-01 RX ADMIN — FENTANYL CITRATE 25 MCG: 50 INJECTION, SOLUTION INTRAMUSCULAR; INTRAVENOUS at 12:43

## 2018-01-01 RX ADMIN — GABAPENTIN 100 MG: 100 CAPSULE ORAL at 20:51

## 2018-01-01 RX ADMIN — FENTANYL CITRATE 50 MCG: 50 INJECTION, SOLUTION INTRAMUSCULAR; INTRAVENOUS at 10:43

## 2018-01-01 RX ADMIN — TAMSULOSIN HYDROCHLORIDE 0.4 MG: 0.4 CAPSULE ORAL at 08:26

## 2018-01-01 RX ADMIN — IPRATROPIUM BROMIDE 2 PUFF: 17 AEROSOL, METERED RESPIRATORY (INHALATION) at 19:31

## 2018-01-01 RX ADMIN — PREDNISONE 5 MG: 5 TABLET ORAL at 08:26

## 2018-01-01 RX ADMIN — GABAPENTIN 100 MG: 100 CAPSULE ORAL at 15:45

## 2018-01-01 RX ADMIN — OXYCODONE HYDROCHLORIDE AND ACETAMINOPHEN 1 TABLET: 5; 325 TABLET ORAL at 20:51

## 2018-01-01 RX ADMIN — EPINEPHRINE 0.01 MG: 1 INJECTION, SOLUTION, CONCENTRATE INTRAVENOUS at 11:25

## 2018-01-01 RX ADMIN — EPINEPHRINE 0.01 MG: 1 INJECTION, SOLUTION, CONCENTRATE INTRAVENOUS at 11:07

## 2018-01-01 RX ADMIN — ACETAMINOPHEN 650 MG: 325 TABLET ORAL at 06:32

## 2018-01-01 RX ADMIN — IPRATROPIUM BROMIDE 2 PUFF: 17 AEROSOL, METERED RESPIRATORY (INHALATION) at 14:07

## 2018-01-01 RX ADMIN — ACETAMINOPHEN 650 MG: 325 TABLET ORAL at 15:49

## 2018-01-01 RX ADMIN — ALBUMIN HUMAN 250 ML: 50 SOLUTION INTRAVENOUS at 11:11

## 2018-01-01 RX ADMIN — TAMSULOSIN HYDROCHLORIDE 0.4 MG: 0.4 CAPSULE ORAL at 10:12

## 2018-01-01 RX ADMIN — IPRATROPIUM BROMIDE 2 PUFF: 17 AEROSOL, METERED RESPIRATORY (INHALATION) at 20:12

## 2018-01-01 RX ADMIN — GABAPENTIN 100 MG: 100 CAPSULE ORAL at 09:05

## 2018-01-01 RX ADMIN — GABAPENTIN 100 MG: 100 CAPSULE ORAL at 21:25

## 2018-01-01 RX ADMIN — ALBUMIN HUMAN 250 ML: 50 SOLUTION INTRAVENOUS at 10:59

## 2018-01-01 RX ADMIN — DOCUSATE SODIUM 100 MG: 100 CAPSULE, LIQUID FILLED ORAL at 10:12

## 2018-01-01 RX ADMIN — PROPOFOL 160 MG: 10 INJECTION, EMULSION INTRAVENOUS at 10:50

## 2018-01-01 RX ADMIN — SODIUM CHLORIDE, SODIUM LACTATE, POTASSIUM CHLORIDE, CALCIUM CHLORIDE: 600; 310; 30; 20 INJECTION, SOLUTION INTRAVENOUS at 11:17

## 2018-01-01 RX ADMIN — Medication 2 G: at 03:59

## 2018-01-01 RX ADMIN — CYCLOBENZAPRINE HYDROCHLORIDE 10 MG: 10 TABLET, FILM COATED ORAL at 20:50

## 2018-01-01 RX ADMIN — EPINEPHRINE 0.01 MG: 1 INJECTION, SOLUTION, CONCENTRATE INTRAVENOUS at 11:12

## 2018-01-01 RX ADMIN — IPRATROPIUM BROMIDE 2 PUFF: 17 AEROSOL, METERED RESPIRATORY (INHALATION) at 17:55

## 2018-01-01 RX ADMIN — FENTANYL CITRATE 25 MCG: 50 INJECTION, SOLUTION INTRAMUSCULAR; INTRAVENOUS at 13:19

## 2018-01-01 RX ADMIN — IPRATROPIUM BROMIDE 2 PUFF: 17 AEROSOL, METERED RESPIRATORY (INHALATION) at 13:39

## 2018-01-01 RX ADMIN — PREDNISONE 5 MG: 5 TABLET ORAL at 10:13

## 2018-01-01 RX ADMIN — DOCUSATE SODIUM 100 MG: 100 CAPSULE, LIQUID FILLED ORAL at 17:58

## 2018-01-01 RX ADMIN — IPRATROPIUM BROMIDE 2 PUFF: 17 AEROSOL, METERED RESPIRATORY (INHALATION) at 10:14

## 2018-01-01 RX ADMIN — FUROSEMIDE 20 MG: 20 TABLET ORAL at 08:26

## 2018-01-01 RX ADMIN — GABAPENTIN 100 MG: 100 CAPSULE ORAL at 08:26

## 2018-01-01 RX ADMIN — GABAPENTIN 100 MG: 100 CAPSULE ORAL at 15:44

## 2018-01-01 RX ADMIN — TAMSULOSIN HYDROCHLORIDE 0.4 MG: 0.4 CAPSULE ORAL at 09:05

## 2018-01-01 RX ADMIN — FENTANYL CITRATE 50 MCG: 50 INJECTION, SOLUTION INTRAMUSCULAR; INTRAVENOUS at 10:50

## 2018-06-06 NOTE — PERIOP NOTES
[facility]  Preoperative Instructions        Surgery Date 6/8/18          Time of Arrival 0800    1. On the day of your surgery, please report to the Surgical Services Registration Desk and sign in at your designated time. The Surgery Center is located to the right of the Emergency Room. 2. You must have someone with you to drive you home. You should not drive a car for 24 hours following surgery. Please make arrangements for a friend or family member to stay with you for the first 24 hours after your surgery. 3. Do not have anything to eat or drink (including water, gum, mints, coffee, juice) after midnight 6/7/18?? Lavada Saucer ? This may not apply to medications prescribed by your physician. ?(Please note below the special instructions with medications to take the morning of your procedure.)    4. We recommend you do not drink any alcoholic beverages for 24 hours before and after your surgery. 5. Contact your surgeons office for instructions on the following medications: non-steroidal anti-inflammatory drugs (i.e. Advil, Aleve), vitamins, and supplements. (Some surgeons will want you to stop these medications prior to surgery and others may allow you to take them)  **If you are currently taking Plavix, Coumadin, Aspirin and/or other blood-thinning agents, contact your surgeon for instructions. ** Your surgeon will partner with the physician prescribing these medications to determine if it is safe to stop or if you need to continue taking. Please do not stop taking these medications without instructions from your surgeon    6. Wear comfortable clothes. Wear glasses instead of contacts. Do not bring any money or jewelry. Please bring picture ID, insurance card, and any prearranged co-payment or hospital payment. Do not wear make-up, particularly mascara the morning of your surgery. Do not wear nail polish, particularly if you are having foot /hand surgery.   Wear your hair loose or down, no ponytails, buns, leonel pins or clips. All body piercings must be removed. Please shower with antibacterial soap for three consecutive days before and on the morning of surgery, but do not apply any lotions, powders or deodorants after the shower on the day of surgery. Please use a fresh towels after each shower. Please sleep in clean clothes and change bed linens the night before surgery. Please do not shave for 48 hours prior to surgery. Shaving of the face is acceptable. 7. You should understand that if you do not follow these instructions your surgery may be cancelled. If your physical condition changes (I.e. fever, cold or flu) please contact your surgeon as soon as possible. 8. It is important that you be on time. If a situation occurs where you may be late, please call [phone] (OR Holding Area). 9. If you have any questions and or problems, please call [phone] (Pre-admission Testing). 10. Your surgery time may be subject to change. You will receive a phone call the evening prior if your time changes. 11.  If having outpatient surgery, you must have someone to drive you here, stay with you during the duration of your stay, and to drive you home at time of discharge. 12.   In an effort to improve the efficiency, privacy, and safety for all of our Pre-op patients visitors are not allowed in the Holding area. Once you arrive and are registered your family/visitors will be asked to remain in the waiting room. The Pre-op staff will get you from the Surgical Waiting Area and will explain to you and your family/visitors that the Pre-op phase is beginning. The staff will answer any questions and provide instructions for tracking of the patient, by use of the existing tracking number and color-coded status board in the waiting room.   At this time the staff will also ask for your designated spokesperson information in the event that the physician or staff need to provide an update or obtain any pertinent information. The designated spokesperson will be notified if the physician needs to speak to family during the pre-operative phase. If at any time your family/visitors has questions or concerns they may approach the volunteer desk in the waiting area for assistance. Special Instructions:use & bring inhalers    MEDICATIONS TO TAKE THE MORNING OF SURGERY WITH A SIP OF WATER:neurontin,peridex. Tylenol or Percocet if needed. I understand a pre-operative phone call will be made to verify my surgery time. In the event that I am not available, I give permission for a message to be left on my answering service and/or with another person?   [address] @ [phone]         ___________________      __________   _________    (Signature of Patient)             (Witness)                (Date and Time)

## 2018-06-08 PROBLEM — M86.172 OSTEOMYELITIS OF FOOT, LEFT, ACUTE (HCC): Status: ACTIVE | Noted: 2018-01-01

## 2018-06-08 NOTE — BRIEF OP NOTE
BRIEF OPERATIVE NOTE    Date of Procedure: 6/8/2018   Preoperative Diagnosis: PERIPHERAL VASCULAR DISEASE  Postoperative Diagnosis: PERIPHERAL VASCULAR DISEASE    Procedure(s):  LEFT ABOVE KNEE AMPUTATION (AKA)  Surgeon(s) and Role:     * Teresita Fontaine MD - Primary         Surgical Assistant: None    Surgical Staff:  Circ-1: Galen Brandon RN  Circ-Intern: Jackquelyn Phoenix, RN  Scrub Tech-1: Lucendia Sera Deal  Surg Asst-1: Darrell Pickering  Event Time In   Incision Start 1135   Incision Close 1209     Anesthesia: General   Estimated Blood Loss: 100 cc  Specimens:   ID Type Source Tests Collected by Time Destination   1 : LEFT ABOVE KNEE AMPUTATION Fresh Leg, Left  Teresita Fontaine MD 6/8/2018 1137 Pathology      Findings: LT AKA. Severe hypotension on induction requiring epi and Charlie.    Complications: None  Implants: * No implants in log *

## 2018-06-08 NOTE — IP AVS SNAPSHOT
[address] 
[phone] Patient: Cadence Beach. MRN: [id number] FFR:54/14/3599 A check tamra indicates which time of day the medication should be taken. My Medications CONTINUE taking these medications Instructions Each Dose to Equal  
 Morning Noon Evening Bedtime * ALBUTEROL SULFATE IN Your last dose was: Your next dose is: Take  by inhalation. 2.5 MG/3 ML NEBULIZER AS NEEDED  
     
   
   
   
  
 * PROAIR HFA 90 mcg/actuation inhaler Generic drug:  albuterol Your last dose was: Your next dose is: Take  by inhalation two (2) times a day. ATROVENT HFA 17 mcg/actuation inhaler Generic drug:  ipratropium Your last dose was: Your next dose is: Take 2 Puffs by inhalation four (4) times daily. 2 Puff CARRASYN HYDROGEL WOUND DRESS topical gel Generic drug:  gel Dressing Your last dose was: Your next dose is:    
   
   
 Apply  to affected area as needed. cyclobenzaprine 5 mg tablet Commonly known as:  FLEXERIL Your last dose was: Your next dose is: Take 10 mg by mouth nightly. & AS NEEDED 10 mg  
    
   
   
   
  
 docusate sodium 100 mg capsule Commonly known as:  [place] [employer] Your last dose was: Your next dose is: Take 100 mg by mouth as needed for Constipation. 100 mg * DULCOLAX (BISACODYL) 5 mg EC tablet Generic drug:  bisacodyl Your last dose was: Your next dose is: Take 5 mg by mouth daily as needed for Constipation. 5 mg * DULCOLAX (BISACODYL) 10 mg suppository Generic drug:  bisacodyl Your last dose was: Your next dose is: Insert 10 mg into rectum as needed. 10 mg  
    
   
   
   
  
 furosemide 40 mg tablet Commonly known as:  LASIX Your last dose was: Your next dose is: Take 20 mg by mouth daily. 20 mg  
    
   
   
   
  
 naloxone (NARCAN) 0.4mg in lactated ringers 1000 mL infusion Your last dose was: Your next dose is:    
   
   
 by IntraVENous route. NEURONTIN 100 mg capsule Generic drug:  gabapentin Your last dose was: Your next dose is: Take 100 mg by mouth three (3) times daily. 100 mg  
    
   
   
   
  
 oxyCODONE-acetaminophen 5-325 mg per tablet Commonly known as:  PERCOCET Your last dose was: Your next dose is: Take 1 Tab by mouth every four (4) hours as needed for Pain. Max Daily Amount: 6 Tabs. 1 Tab PERIDEX 0.12 % solution Generic drug:  chlorhexidine Your last dose was: Your next dose is:    
   
   
 15 mL by Swish and Spit route daily. 15 mL VILLEGAS MILK OF MAGNESIA 400 mg/5 mL suspension Generic drug:  magnesium hydroxide Your last dose was: Your next dose is: Take 30 mL by mouth daily as needed for Constipation. 30 mL Polyethylene Glycol 3350 Powd Your last dose was: Your next dose is:    
   
   
 by Does Not Apply route as needed. potassium chloride SA 10 mEq capsule Commonly known as:  Rama Yunior Your last dose was: Your next dose is: Take 10 mEq by mouth daily. 10 mEq  
    
   
   
   
  
 tamsulosin 0.4 mg capsule Commonly known as:  FLOMAX Your last dose was: Your next dose is: Take 0.4 mg by mouth daily. 0.4 mg  
    
   
   
   
  
 * Notice: This list has 4 medication(s) that are the same as other medications prescribed for you.  Read the directions carefully, and ask your doctor or other care provider to review them with you. STOP taking these medications MAPAP (ACETAMINOPHEN) PO  
   
  
 predniSONE 10 mg tablet Commonly known as:  Pb Noss Where to Get Your Medications Information on where to get these meds will be given to you by the nurse or doctor. ! Ask your nurse or doctor about these medications  
  oxyCODONE-acetaminophen 5-325 mg per tablet

## 2018-06-08 NOTE — IP AVS SNAPSHOT
[facility] 
[phone] Patient: Radha Shukla. MRN: [id number] [facility]:16/40/1494 About your hospitalization You were admitted on:  June 8, 2018 You last received care in the:  [facility] 2 PROGRESSIVE CARE You were discharged on:  June 11, 2018 Why you were hospitalized Your primary diagnosis was:  Not on File Your diagnoses also included:  Osteomyelitis Of Foot, Left, Acute (Hcc) Follow-up Information Follow up With Details Comments Contact Info Annelle Lundborg, MD  3-4 weeks for staple removal.  [address] [facility] 
[phone] Ciro Diehl MD   [address] [facility] [address] [facility] [address] [facility] [phone] [id number] 
[phone] Your Scheduled Appointments Friday June 22, 2018 10:20 AM EDT  
ESTABLISHED PATIENT with Tyron Brandon MD  
[address] [place] [facility] [address] [id number] [phone] Discharge Orders None A check tamra indicates which time of day the medication should be taken. My Medications CONTINUE taking these medications Instructions Each Dose to Equal  
 Morning Noon Evening Bedtime * ALBUTEROL SULFATE IN Your last dose was: Your next dose is: Take  by inhalation. 2.5 MG/3 ML NEBULIZER AS NEEDED  
     
   
   
   
  
 * PROAIR HFA 90 mcg/actuation inhaler Generic drug:  albuterol Your last dose was: Your next dose is: Take  by inhalation two (2) times a day. ATROVENT HFA 17 mcg/actuation inhaler Generic drug:  ipratropium Your last dose was: Your next dose is: Take 2 Puffs by inhalation four (4) times daily. 2 Puff CARRASYN HYDROGEL WOUND DRESS topical gel Generic drug:  gel Dressing Your last dose was: Your next dose is:    
   
   
 Apply  to affected area as needed. cyclobenzaprine 5 mg tablet Commonly known as:  FLEXERIL Your last dose was: Your next dose is: Take 10 mg by mouth nightly. & AS NEEDED 10 mg  
    
   
   
   
  
 docusate sodium 100 mg capsule Commonly known as:  Genice Flax Your last dose was: Your next dose is: Take 100 mg by mouth as needed for Constipation. 100 mg * DULCOLAX (BISACODYL) 5 mg EC tablet Generic drug:  bisacodyl Your last dose was: Your next dose is: Take 5 mg by mouth daily as needed for Constipation. 5 mg * DULCOLAX (BISACODYL) 10 mg suppository Generic drug:  bisacodyl Your last dose was: Your next dose is: Insert 10 mg into rectum as needed. 10 mg  
    
   
   
   
  
 furosemide 40 mg tablet Commonly known as:  LASIX Your last dose was: Your next dose is: Take 20 mg by mouth daily. 20 mg  
    
   
   
   
  
 naloxone (NARCAN) 0.4mg in lactated ringers 1000 mL infusion Your last dose was: Your next dose is:    
   
   
 by IntraVENous route. NEURONTIN 100 mg capsule Generic drug:  gabapentin Your last dose was: Your next dose is: Take 100 mg by mouth three (3) times daily. 100 mg  
    
   
   
   
  
 oxyCODONE-acetaminophen 5-325 mg per tablet Commonly known as:  PERCOCET Your last dose was: Your next dose is: Take 1 Tab by mouth every four (4) hours as needed for Pain. Max Daily Amount: 6 Tabs. 1 Tab PERIDEX 0.12 % solution Generic drug:  chlorhexidine Your last dose was: Your next dose is:    
   
   
 15 mL by Swish and Spit route daily. 15 mL VILLEGAS MILK OF MAGNESIA 400 mg/5 mL suspension Generic drug:  magnesium hydroxide Your last dose was: Your next dose is: Take 30 mL by mouth daily as needed for Constipation. 30 mL Polyethylene Glycol 3350 Powd Your last dose was: Your next dose is:    
   
   
 by Does Not Apply route as needed. potassium chloride SA 10 mEq capsule Commonly known as:  Andie Muster Your last dose was: Your next dose is: Take 10 mEq by mouth daily. 10 mEq  
    
   
   
   
  
 tamsulosin 0.4 mg capsule Commonly known as:  FLOMAX Your last dose was: Your next dose is: Take 0.4 mg by mouth daily. 0.4 mg  
    
   
   
   
  
 * Notice: This list has 4 medication(s) that are the same as other medications prescribed for you. Read the directions carefully, and ask your doctor or other care provider to review them with you. STOP taking these medications MAPAP (ACETAMINOPHEN) PO  
   
  
 predniSONE 10 mg tablet Commonly known as:  Kerrie Perez Where to Get Your Medications Information on where to get these meds will be given to you by the nurse or doctor. ! Ask your nurse or doctor about these medications  
  oxyCODONE-acetaminophen 5-325 mg per tablet Opioid Education Prescription Opioids: What You Need to Know: 
 
Prescription opioids can be used to help relieve moderate-to-severe pain and are often prescribed following a surgery or injury, or for certain health conditions. These medications can be an important part of treatment but also come with serious risks. Opioids are strong pain medicines. Examples include hydrocodone, oxycodone, fentanyl, and morphine. Heroin is an example of an illegal opioid. It is important to work with your health care provider to make sure you are getting the safest, most effective care. WHAT ARE THE RISKS AND SIDE EFFECTS OF OPIOID USE? Prescription opioids carry serious risks of addiction and overdose, especially with prolonged use. An opioid overdose, often marked by slow breathing, can cause sudden death. The use of prescription opioids can have a number of side effects as well, even when taken as directed. · Tolerance-meaning you might need to take more of a medication for the same pain relief · Physical dependence-meaning you have symptoms of withdrawal when the medication is stopped. Withdrawal symptoms can include nausea, sweating, chills, diarrhea, stomach cramps, and muscle aches. Withdrawal can last up to several weeks, depending on which drug you took and how long you took it. · Increased sensitivity to pain · Constipation · Nausea, vomiting, and dry mouth · Sleepiness and dizziness · Confusion · Depression · Low levels of testosterone that can result in lower sex drive, energy, and strength · Itching and sweating RISKS ARE GREATER WITH:      
· History of drug misuse, substance use disorder, or overdose · Mental health conditions (such as depression or anxiety) · Sleep apnea · Older age (72 years or older) · Pregnancy Avoid alcohol while taking prescription opioids. Also, unless specifically advised by your health care provider, medications to avoid include: · Benzodiazepines (such as Xanax or Valium) · Muscle relaxants (such as Soma or Flexeril) · Hypnotics (such as Ambien or Lunesta) · Other prescription opioids KNOW YOUR OPTIONS Talk to your health care provider about ways to manage your pain that don't involve prescription opioids. Some of these options may actually work better and have fewer risks and side effects. Options may include: 
· Pain relievers such as acetaminophen, ibuprofen, and naproxen · Some medications that are also used for depression or seizures · Physical therapy and exercise · Counseling to help patients learn how to cope better with triggers of pain and stress. · Application of heat or cold compress · Massage therapy · Relaxation techniques Be Informed Make sure you know the name of your medication, how much and how often to take it, and its potential risks & side effects. IF YOU ARE PRESCRIBED OPIOIDS FOR PAIN: 
· Never take opioids in greater amounts or more often than prescribed. Remember the goal is not to be pain-free but to manage your pain at a tolerable level. · Follow up with your primary care provider to: · Work together to create a plan on how to manage your pain. · Talk about ways to help manage your pain that don't involve prescription opioids. · Talk about any and all concerns and side effects. · Help prevent misuse and abuse. · Never sell or share prescription opioids · Help prevent misuse and abuse. · Store prescription opioids in a secure place and out of reach of others (this may include visitors, children, friends, and family). · Safely dispose of unused/unwanted prescription opioids: Find your community drug take-back program or your pharmacy mail-back program, or flush them down the toilet, following guidance from the Food and Drug Administration (www.fda.gov/Drugs/ResourcesForYou). · Visit www.cdc.gov/drugoverdose to learn about the risks of opioid abuse and overdose. · If you believe you may be struggling with addiction, tell your health care provider and ask for guidance or call [employer] at 4-486-688-VBLI. Discharge Instructions Patient may shower. Dry the wound carefully. The dressing can be removed if there is no drainage, or changed and kept in place for comfort. Patient should not attempt to drive a car until they are comfortable and NOT taking pain medication. No heavy lifting (7 lbs limit). Mild exercise and light duties around the house are OK. Call the office at [phone] if there are any problems. ACO Transitions of Care Introducing [employer] [address] Yanci Slade offers a voluntary care coordination program to provide high quality service and care to [facility] fee-for-service beneficiaries. Meli Baptiste was designed to help you enhance your health and well-being through the following services: ? Transitions of Care  support for individuals who are transitioning from one care setting to another (example: Hospital to home). ? Chronic and Complex Care Coordination  support for individuals and caregivers of those with serious or chronic illnesses or with more than one chronic (ongoing) condition and those who take a number of different medications. If you meet specific medical criteria, a [facility] may call you directly to coordinate your care with your primary care physician and your other care providers. For questions about the [facility] programs, please, contact your physicians office. For general questions or additional information about Accountable Care Organizations: 
Please visit www.medicare.gov/acos. html or call 1-800-MEDICARE (5-735.181.5786) TTY users should call 2-897.807.9242. Leondra music Announcement We are excited to announce that we are making your provider's discharge notes available to you in Leondra music. You will see these notes when they are completed and signed by the physician that discharged you from your recent hospital stay. If you have any questions or concerns about any information you see in Leondra music, please call the Health Information Department where you were seen or reach out to your Primary Care Provider for more information about your plan of care. Introducing [facility] SERVICES! [employer] introduces Vital Therapiest patient portal. Now you can access parts of your medical record, email your doctor's office, and request medication refills online. 1. In your internet browser, go to [email or web address] 2. Click on the First Time User? Click Here link in the Sign In box. You will see the New Member Sign Up page. 3. Enter your LVL6 Access Code exactly as it appears below. You will not need to use this code after youve completed the sign-up process. If you do not sign up before the expiration date, you must request a new code. · LVL6 Access Code: [email or web address] Expires: 9/5/2018  8:48 AM 
 
4. Enter the last four digits of your Social Security Number (xxxx) and Date of Birth (mm/dd/yyyy) as indicated and click Submit. You will be taken to the next sign-up page. 5. Create a LVL6 ID. This will be your LVL6 login ID and cannot be changed, so think of one that is secure and easy to remember. 6. Create a LVL6 password. You can change your password at any time. 7. Enter your Password Reset Question and Answer. This can be used at a later time if you forget your password. 8. Enter your e-mail address. You will receive e-mail notification when new information is available in [address] 9. Click Sign Up. You can now view and download portions of your medical record. 10. Click the Download Summary menu link to download a portable copy of your medical information. If you have questions, please visit the Frequently Asked Questions section of the LVL6 website. Remember, LVL6 is NOT to be used for urgent needs. For medical emergencies, dial 911. Now available from your iPhone and Android! Introducing Reggie Reyes As a [employer] patient, I wanted to make you aware of our electronic visit tool called Reggie Reyes. [employer] 24/7 allows you to connect within minutes with a medical provider 24 hours a day, seven days a week via a mobile device or tablet or logging into a secure website from your computer. You can access Like[employer] Local from anywhere in the [place]. A virtual visit might be right for you when you have a simple condition and feel like you just dont want to get out of bed, or cant get away from work for an appointment, when your regular [facility] provider is not available (evenings, weekends or holidays), or when youre out of town and need minor care. Electronic visits cost only $49 and if the ArandaPandaBed 24/[employer] provider determines a prescription is needed to treat your condition, one can be electronically transmitted to a nearby pharmacy*. Please take a moment to enroll today if you have not already done so. The enrollment process is free and takes just a few minutes. To enroll, please download the Pager merrill to your tablet or phone, or visit [email or web address] to enroll on your computer. And, as an [address] [place] [address] patient with a [facility] account, the results of your visits will be scanned into your electronic medical record and your primary care provider will be able to view the scanned results. We urge you to continue to see your regular [facility] provider for your ongoing medical care. And while your primary care provider may not be the one available when you seek a Reggie Reyes virtual visit, the peace of mind you get from getting a real diagnosis real time can be priceless. For more information on Reggie Prospersidrafin, view our Frequently Asked Questions (FAQs) at [email or web address]. Sincerely, 
 
Afshan Shea MD 
Chief Medical Officer Mary Slade *:  certain medications cannot be prescribed via Reggie FD9 Groupkaylene Providers Seen During Your Hospitalization Provider Specialty Primary office phone Grayson Del Angel MD Vascular Surgery [phone] Your Primary Care Physician (PCP) Primary Care Physician Office Phone Office Fax Cornelia Holter [phone] You are allergic to the following No active allergies Recent Documentation Height Weight BMI Smoking Status 1.778 m 68 kg 21.51 kg/m2 Current Every Day Smoker Emergency Contacts Name Discharge Info Relation Home Work Mobile Nick Aldridge CAREGIVER [3] Sister [23] [phone] [address]  Other Relative [6] [phone] Elaine Loveless  Other Relative [6] [phone] Patient Belongings The following personal items are in your possession at time of discharge: 
  Dental Appliances: None  Visual Aid: Glasses      Home Medications: Kept at bedside   Jewelry: None  Clothing:  (white undershirt, hospital gon)    Other Valuables: Cigarettes, Eyeglasses, Wallet, Keys  Personal Items Sent to Safe: cash, lighter, cigarettes to security, see req f/info Please provide this summary of care documentation to your next provider. Signatures-by signing, you are acknowledging that this After Visit Summary has been reviewed with you and you have received a copy. Patient Signature:  ____________________________________________________________ Date:  ____________________________________________________________  
  
Claudene Born Provider Signature:  ____________________________________________________________ Date:  ____________________________________________________________

## 2018-06-08 NOTE — H&P
History and Physical    Subjective:     Darius Kramer is a 69 y.o. 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 He is a heavy smoker and has severe PAD. He has nonhealing wounds on both feet with osteomyelitis of the left foot. Past Medical History:   Diagnosis Date    Atrial fibrillation ([facility] 5/26/2016    Chronic obstructive pulmonary disease ([facility]     per notes; chronic pulm edema    Heart failure ([facility]     Hypertension     Ill-defined condition     muscular dystrophy    S/P cardiac pacemaker procedure 5/25/2016 5/25/16 Medtronic dual chamber pacemaker implant      Past Surgical History:   Procedure Laterality Date    HX PACEMAKER       History reviewed. No pertinent family history. Social History   Substance Use Topics    Smoking status: Current Every Day Smoker     Packs/day: 0.25     Years: 60.00     Types: Cigarettes    Smokeless tobacco: Never Used    Alcohol use No       Prior to Admission medications    Medication Sig Start Date End Date Taking? Authorizing Provider   oxyCODONE-acetaminophen (PERCOCET) 5-325 mg per tablet Take 1 Tab by mouth every four (4) hours as needed for Pain. Yes Historical Provider   naloxone [facility]) 0.4mg in lactated ringers 1000 mL infusion by IntraVENous route. Yes Historical Provider   gel Dressing (CARRASYN HYDROGEL WOUND DRESS) topical gel Apply  to affected area as needed. Yes Historical Provider   potassium chloride SA (MICRO-K) 10 mEq capsule Take 10 mEq by mouth daily. Yes Historical Provider   Polyethylene Glycol 3350 powd by Does Not Apply route as needed. Yes Historical Provider   bisacodyl (DULCOLAX, BISACODYL,) 5 mg EC tablet Take 5 mg by mouth daily as needed for Constipation. Yes Historical Provider   docusate sodium (COLACE) 100 mg capsule Take 100 mg by mouth as needed for Constipation. Yes Historical Provider   albuterol (PROAIR HFA) 90 mcg/actuation inhaler Take  by inhalation two (2) times a day.    Yes Historical Provider   ATROVENT HFA 17 mcg/actuation inhaler Take 2 Puffs by inhalation four (4) times daily. 11/18/16  Yes Historical Provider   predniSONE (DELTASONE) 10 mg tablet Take 5 mg by mouth daily. 11/25/16  Yes Historical Provider   furosemide (LASIX) 40 mg tablet Take 20 mg by mouth daily. 11/25/16  Yes Historical Provider   ALBUTEROL SULFATE IN Take  by inhalation. 2.5 MG/3 ML NEBULIZER AS NEEDED   Yes Historical Provider   cyclobenzaprine (FLEXERIL) 5 mg tablet Take 10 mg by mouth nightly. & AS NEEDED   Yes Historical Provider   MAPAP, ACETAMINOPHEN, PO Take 325 mg by mouth as needed. Yes Historical Provider   tamsulosin (FLOMAX) 0.4 mg capsule Take 0.4 mg by mouth daily. Yes Historical Provider   gabapentin (NEURONTIN) 100 mg capsule Take 100 mg by mouth three (3) times daily. Historical Provider   chlorhexidine (PERIDEX) 0.12 % solution 15 mL by Swish and Spit route daily. Historical Provider   magnesium hydroxide ([employer] MILK OF MAGNESIA) 400 mg/5 mL suspension Take 30 mL by mouth daily as needed for Constipation. Historical Provider   bisacodyl (DULCOLAX, BISACODYL,) 10 mg suppository Insert 10 mg into rectum as needed. Historical Provider     No Known Allergies     Review of Systems:  Denies CP/SOB    Objective:     Physical Exam:   Visit Vitals    /49    Pulse 60    Temp 97.9 °F (36.6 °C)    Resp 14    Ht 5' 10\" (1.778 m)    Wt 68 kg (149 lb 14.6 oz)    SpO2 99%    BMI 21.51 kg/m2     General:  Alert, cooperative, no distress, appears stated age. Head:  Normocephalic, without obvious abnormality, atraumatic. Neck: Supple, symmetrical, trachea midline, no adenopathy, thyroid: no enlargement/tenderness/nodules, no carotid bruit and no JVD. Lungs:   Clear to auscultation bilaterally. Heart:  Regular rate and rhythm, S1, S2 normal, no murmur, click, rub or gallop. Abdomen:   Soft, non-tender. Bowel sounds normal. No masses,  No organomegaly. Extremities: Hip and knee contractures bilaterally. Foot wounds Lt > Rt   Pulses: 2+ femoral w/ none below           Neurologic:  Normal strength, sensation throughout upper extremities.        Assessment:     Active Problems:    Osteomyelitis of foot, left, acute ([facility] (6/8/2018)        Plan:     Left AKA    Signed By: Beth Morillo MD     June 8, 2018

## 2018-06-08 NOTE — PERIOP NOTES
TRANSFER - OUT REPORT:    Verbal report given to KHLOE Rocha on [facility].  being transferred to [facility] for routine post - op       Report consisted of patients Situation, Background, Assessment and   Recommendations(SBAR). Information from the following report(s) OR Summary, Procedure Summary, Intake/Output and MAR was reviewed with the receiving nurse. Opportunity for questions and clarification was provided.       Patient transported with:   Monitor  O2 @ 2 liters  Registered Nurse  [facility]

## 2018-06-08 NOTE — ANESTHESIA POSTPROCEDURE EVALUATION
Post-Anesthesia Evaluation and Assessment    Patient: Janey Jimenez MRN: [id number]  SSN: [id number]    YOB: 1940  Age: 69 y.o. Sex: male       Cardiovascular Function/Vital Signs  Visit Vitals    /56    Pulse 60    Temp 36.6 °C (97.9 °F)    Resp 30    Ht 5' 10\" (1.778 m)    Wt 68 kg (149 lb 14.6 oz)    SpO2 99%    BMI 21.51 kg/m2       Patient is status post total IV anesthesia anesthesia for Procedure(s):  LEFT ABOVE KNEE AMPUTATION (AKA). Nausea/Vomiting: None    Postoperative hydration reviewed and adequate. Pain:  Pain Scale 1: Numeric (0 - 10) (06/08/18 1257)  Pain Intensity 1: 6 (06/08/18 1257)   Managed    Neurological Status:   Neuro (WDL): Exceptions to WDL (06/08/18 1218)  Neuro  Neurologic State: Drowsy; Eyes open spontaneously (06/08/18 1218)  Orientation Level: Oriented to person (06/08/18 1218)  Cognition: Follows commands (06/08/18 1218)  Speech: Clear (06/08/18 1218)  LUE Motor Response: Purposeful (06/08/18 1218)  LLE Motor Response: Purposeful (06/08/18 1218)  RUE Motor Response: Purposeful (06/08/18 1218)  RLE Motor Response: Purposeful (06/08/18 1218)   At baseline    Mental Status and Level of Consciousness: Arousable    Pulmonary Status:   O2 Device: Nasal cannula (06/08/18 1218)   Adequate oxygenation and airway patent    Complications related to anesthesia: None    Post-anesthesia assessment completed.  No concerns    Signed By: Michele Augustine MD     June 8, 2018

## 2018-06-08 NOTE — ANESTHESIA PREPROCEDURE EVALUATION
Anesthetic History   No history of anesthetic complications            Review of Systems / Medical History  Patient summary reviewed, nursing notes reviewed and pertinent labs reviewed    Pulmonary  Within defined limits  COPD               Neuro/Psych   Within defined limits           Cardiovascular  Within defined limits  Hypertension        Dysrhythmias : atrial fibrillation  Pacemaker    Exercise tolerance: <4 METS     GI/Hepatic/Renal  Within defined limits              Endo/Other  Within defined limits           Other Findings   Comments: muscular dystrophy         Physical Exam    Airway  Mallampati: III  TM Distance: 4 - 6 cm  Neck ROM: normal range of motion   Mouth opening: Normal     Cardiovascular  Regular rate and rhythm,  S1 and S2 normal,  no murmur, click, rub, or gallop             Dental    Dentition: Poor dentition     Pulmonary  Breath sounds clear to auscultation               Abdominal  GI exam deferred       Other Findings            Anesthetic Plan    ASA: 3  Anesthesia type: total IV anesthesia                tiva with steroid prep,  precaution

## 2018-06-08 NOTE — PERIOP NOTES
Handoff Report from Operating Room to PACU    Report received from Clifford Mane RN and Davion Morataya CRNA regarding Carlos Ellis. Jerry Gupta      Surgeon(s): Beth Morillo MD  And Procedure(s) (LRB):  LEFT ABOVE KNEE AMPUTATION (AKA) (Left)  confirmed   with dressings discussed. Anesthesia type, drugs, patient history, complications, estimated blood loss, vital signs, intake and output, and last pain medication, lines and temperature were reviewed.

## 2018-06-09 NOTE — PROGRESS NOTES
7:30 PM Bedside report received from [address].    10:43 PM Spoke with Dr. Olivia Sanchez, on call for Dr. Tanvi Galicia, regarding patient's request for PO tylenol for pain management. Orders received for 650 mg PO tylenol Q4 PRN. See MAR. Will continue to monitor.

## 2018-06-09 NOTE — PROGRESS NOTES
Primary Nurse Anmol Haddad, RN and Linda Puentes, RN performed a dual skin assessment on this patient No impairment noted  Dangelo score is 13    Left AKA closed with staples no pressure ulcer. Dressing intact since surgery. Staples, 4x4, ABD, Curlix, tape.

## 2018-06-09 NOTE — PROGRESS NOTES
Orders received, chart reviewed. Spoke to pt who reports that at baseline, he is dependent for all aspects of functional mobility and ADLs secondary to muscular dystrophy. Pt is a LTC resident at [facility] where he has lived for past several years. Staff at [address] [place] [address] utilize [address] for bed <> power wheelchair transfers with remaining of mobility occurred at wheelchair level. Pt reports receiving PT services at facility and states that they were not effective and \"had no point. \" Pt reports ability to self feed with BUEs propped up on armrest of wheelchair however is completely dependent for all other ADLs and mobility. Pt with pancake call bell secondary to nonfunctional strength/use of BUEs. Pt reports feeling at his baseline dependent level and declines need for skilled PT intervention. Recommend PMT utilize [address] for bed <>reclinder chair transfers while in hospital. Pt states his plan is to return to LTC facility at discharge and he has all necessary equipment including hospital bed, eddie lift, power wheelchair, and specialty cushion. Will complete PT order at this time.  Thank you    Kadie Floyd, PT, DPT

## 2018-06-09 NOTE — PROGRESS NOTES
Orders received, chart reviewed. Spoke to pt who reports that at baseline, he is dependent for all aspects of functional mobility and ADLs secondary to muscular dystrophy. Pt is a LTC resident at [facility] where he has lived for past several years. Staff at [address] [place] [address] utilize [address] for bed <> power wheelchair transfers with remaining of mobility occurred at wheelchair level. Pt reports ability to self feed with BUEs propped up on armrest of wheelchair however is completely dependent for all other ADLs and mobility. Pt with pancake call bell secondary to nonfunctional strength/use of BUEs. Pt reports feeling at his baseline dependent level and declines need for skilled OT intervention. Recommend PMT utilize [address] for bed <>reclinder chair transfers while in hospital. Staff to assist with feeding if pt in bed at mealtimes. Recommend OOB for meals with pillow prop for B UE if possible. Pt states his plan is to return to LTC facility at discharge and he has all necessary equipment including hospital bed, eddie lift, power wheelchair, and specialty cushion. Will complete OT order at this time. Thank you.   Soila Jara, OTR/L

## 2018-06-11 NOTE — PROGRESS NOTES
Called and spoke with Jannette Pearson at [facility] and he is aware patient is discharging back to them today. Medicals faxed to him at [phone].

## 2018-06-11 NOTE — PROGRESS NOTES
Hospital to SNF SBAR Handoff - [address].                                                                        69 y.o.   male    [address]ax [address]   Room: [facility]/01    [facility] 2 PROGRESSIVE CARE  Unit Phone# :  [phone]      [facility] [address]  [address] [address]  Dept: [phone]  Loc: [phone]                    SITUATION     Admitted:  6/8/2018         Attending Provider:  Anastasia Cruz, *       Consultations:  None    PCP:  Ryan Jara MD   [phone]    Treatment Team: Attending Provider: Anastasia Cruz MD; Utilization Review: Kamaljit Alvarez; Care Manager: Callie Small RN    Admitting Dx:  PVD  Osteomyelitis of foot, left, acute ([facility]       Principal Problem: <principal problem not specified>    3 Days Post-Op of   Procedure(s):  LEFT ABOVE KNEE AMPUTATION (AKA)   BY: Anastasia Cruz MD             ON: 6/8/2018                  Code Status: Prior                Advance Directives:   Advance Care Planning 5/25/2016   Patient's Healthcare Decision Maker is: Legal Next of Ashly Newman   Primary Decision Maker Name Wilian Peters   Primary Decision Maker Phone Number [phone]   Primary Decision Maker Relationship to Patient Sibling   Confirm Advance Directive Yes, not on file    (Send w/patient)   Not Received       Isolation:  There are currently no Active Isolations       MDRO: No current active infections    Pain Medications given:  Oxycodone    Last dose: 6/11/2018 at  [address] [facility] needed: no  Type of equipment:         BACKGROUND     Allergies:  No Known Allergies    Past Medical History:   Diagnosis Date    Atrial fibrillation ([facility] 5/26/2016    Chronic obstructive pulmonary disease ([facility]     per notes; chronic pulm edema    Heart failure ([facility]     Hypertension     Ill-defined condition     muscular dystrophy    S/P cardiac pacemaker procedure 5/25/2016 5/25/16 Medtronic dual chamber pacemaker implant       Past Surgical History:   Procedure Laterality Date    HX PACEMAKER         Prescriptions Prior to Admission   Medication Sig    naloxone (NARCAN) 0.4mg in lactated ringers 1000 mL infusion by IntraVENous route.  gel Dressing (CARRASYN HYDROGEL WOUND DRESS) topical gel Apply  to affected area as needed.  potassium chloride SA (MICRO-K) 10 mEq capsule Take 10 mEq by mouth daily.  Polyethylene Glycol 3350 powd by Does Not Apply route as needed.  bisacodyl (DULCOLAX, BISACODYL,) 5 mg EC tablet Take 5 mg by mouth daily as needed for Constipation.  docusate sodium (COLACE) 100 mg capsule Take 100 mg by mouth as needed for Constipation.  albuterol (PROAIR HFA) 90 mcg/actuation inhaler Take  by inhalation two (2) times a day.  ATROVENT HFA 17 mcg/actuation inhaler Take 2 Puffs by inhalation four (4) times daily.  predniSONE (DELTASONE) 10 mg tablet Take 5 mg by mouth daily.  furosemide (LASIX) 40 mg tablet Take 20 mg by mouth daily.  ALBUTEROL SULFATE IN Take  by inhalation. 2.5 MG/3 ML NEBULIZER AS NEEDED    cyclobenzaprine (FLEXERIL) 5 mg tablet Take 10 mg by mouth nightly. & AS NEEDED    MAPAP, ACETAMINOPHEN, PO Take 325 mg by mouth as needed.  tamsulosin (FLOMAX) 0.4 mg capsule Take 0.4 mg by mouth daily.  gabapentin (NEURONTIN) 100 mg capsule Take 100 mg by mouth three (3) times daily.  chlorhexidine (PERIDEX) 0.12 % solution 15 mL by Swish and Spit route daily.  magnesium hydroxide (VILLEGAS MILK OF MAGNESIA) 400 mg/5 mL suspension Take 30 mL by mouth daily as needed for Constipation.  bisacodyl (DULCOLAX, BISACODYL,) 10 mg suppository Insert 10 mg into rectum as needed. Hard scripts included in transfer packet yes    Vaccinations: There is no immunization history on file for this patient.     Readmission Risks:    Known Risks: n/a        The Charlson CoMorbitiy Index tool is an evidenced based tool that has more automatic generated information. The tool looks at many different items such as the age of the patient, how many times they were admitted in the last calendar year, current length of stay in the hospital and their diagnosis. All of these items are pulled automatically from information documented in the chart from various places and will generate a score that predicts whether a patient is at low (less than 13), medium (13-20) or high (21 or greater) risk of being readmitted.         ASSESSMENT                Temp: 98.5 °F (36.9 °C) (06/11/18 1057) Pulse (Heart Rate): 60 (06/11/18 1057)     Resp Rate: 18 (06/11/18 1057)           BP: 147/50 (06/11/18 1057)     O2 Sat (%): 95 % (06/11/18 1057)     Weight: 68 kg (149 lb 14.6 oz)    Height: 5' 10\" (177.8 cm) (06/08/18 0854)       If above not within 1 hour of discharge:    BP:_____  P:____  R:____ T:_____ O2 Sat: ___%  O2: ______    Active Orders   Diet    DIET CARDIAC Regular         Orientation: oriented to time, place, person and situation     Active Behaviors: None                                   Active Lines/Drains:  (Peg Tube / De Anda / CL or S/L?): no    Urinary Status: Voiding     Last BM: Last Bowel Movement Date: 06/11/18     Skin Integrity: Wound (add Wound LDA)   Wound Leg Left-DRESSING STATUS: Clean, Dry, Intact    Wound Leg Left-DRESSING TYPE: Gauze    Mobility: Very limited   Weight Bearing Status: NWB (Non Weight Bearing)                Lab Results   Component Value Date/Time    Glucose 145 (H) 06/09/2018 04:16 AM    HGB 13.5 06/09/2018 04:16 AM        RECOMMENDATION     See After Visit Summary (AVS) for:  · Discharge instructions  · After [address]   · Special equipment needed (entered pre-discharge by Care Management)  · Medication Reconciliation    · Follow up Appointment(s)         Report given/sent by:  Rinku Vinson                    Verbal report given to: Kaitlin Seymour LPN  FAXED to:  [phone]         Estimated discharge time:  6/11/2018 at 1300

## 2018-06-11 NOTE — DISCHARGE SUMMARY
Vascular Surgery Discharge Summary     Patient ID:  Ollie Smith  [id number]  65 y.o.  1940    Admitting Provider: Humphrey Hawkins MD    Admit Date: 6/8/2018    Discharge Date: 6/11/2018    Discharge Diagnoses:    Osteomyelitis of foot, left, acute POA yes   Osteomyelitis of the right foot POA yes   Muscular dystrophy POA ye  PAD POA yes    COPD POA yes   ASHD POA yes   HTN POA yes   Hx of PPM POA yes   Hypokalemia POA yes   Tobacco Use POA yes     Procedures:   LEFT ABOVE KNEE AMPUTATION (AKA)  06/08/2018    Hospital Course:   Mr. Tatianna Mccall has a pmhx significant for COPD, ASHD, HTN, and PPM.  He is a paraplegic secondary to muscular dystrophy. He is an every day smoker.  His RUE is flaccid. Renee Olivares has a hx of non-healing ulcers to the BL feet that have been ongoing x 8 months.  ABIs  were R 0.80 with a 0 toe index and L 0.64 with a 0 toe index. He was admitted to the hospital following an elective left AKA. His hospital stay was uneventful. He returned to the primary living facility [facility]. He will follow up in the clinic in 3-4 weeks for staple removal and planning for a right AKA. Pertinent Results:   No results found for this or any previous visit (from the past 24 hour(s)).      Vital signs: Patient Vitals for the past 24 hrs:   BP Temp Pulse Resp SpO2   06/11/18 0727 145/50 97.7 °F (36.5 °C) 60 16 96 %   06/11/18 0400 146/46 98.3 °F (36.8 °C) 60 19 96 %   06/11/18 0000 143/46 98.5 °F (36.9 °C) 60 18 96 %   06/10/18 2040 - - - - 98 %   06/10/18 2000 130/44 98.3 °F (36.8 °C) 60 20 97 %   06/10/18 1600 134/51 98.3 °F (36.8 °C) 60 16 96 %   06/10/18 1211 144/52 98.3 °F (36.8 °C) 60 18 96 %       Patient Weight:   Last 3 Recorded Weights in this Encounter    06/08/18 0854   Weight: 68 kg (149 lb 14.6 oz)       Consults: None    Patient Condition at Discharge: stable    Disposition: Long term care     Patient Instructions:   Current Discharge Medication List      CONTINUE these medications which have CHANGED    Details   oxyCODONE-acetaminophen (PERCOCET) 5-325 mg per tablet Take 1 Tab by mouth every four (4) hours as needed for Pain. Max Daily Amount: 6 Tabs. Qty: 40 Tab, Refills: 0    Associated Diagnoses: Osteomyelitis of foot, left, acute ([facility]         CONTINUE these medications which have NOT CHANGED    Details   naloxone (NARCAN) 0.4mg in lactated ringers 1000 mL infusion by IntraVENous route.      gel Dressing (CARRASYN HYDROGEL WOUND DRESS) topical gel Apply  to affected area as needed. potassium chloride SA (MICRO-K) 10 mEq capsule Take 10 mEq by mouth daily. Polyethylene Glycol 3350 powd by Does Not Apply route as needed. bisacodyl (DULCOLAX, BISACODYL,) 5 mg EC tablet Take 5 mg by mouth daily as needed for Constipation. docusate sodium (COLACE) 100 mg capsule Take 100 mg by mouth as needed for Constipation. !! albuterol (PROAIR HFA) 90 mcg/actuation inhaler Take  by inhalation two (2) times a day. ATROVENT HFA 17 mcg/actuation inhaler Take 2 Puffs by inhalation four (4) times daily. furosemide (LASIX) 40 mg tablet Take 20 mg by mouth daily. !! ALBUTEROL SULFATE IN Take  by inhalation. 2.5 MG/3 ML NEBULIZER AS NEEDED      cyclobenzaprine (FLEXERIL) 5 mg tablet Take 10 mg by mouth nightly. & AS NEEDED      tamsulosin (FLOMAX) 0.4 mg capsule Take 0.4 mg by mouth daily. gabapentin (NEURONTIN) 100 mg capsule Take 100 mg by mouth three (3) times daily. chlorhexidine (PERIDEX) 0.12 % solution 15 mL by Swish and Spit route daily. magnesium hydroxide (VILLEGAS MILK OF MAGNESIA) 400 mg/5 mL suspension Take 30 mL by mouth daily as needed for Constipation. bisacodyl (DULCOLAX, BISACODYL,) 10 mg suppository Insert 10 mg into rectum as needed. !! - Potential duplicate medications found. Please discuss with provider.       STOP taking these medications       predniSONE (DELTASONE) 10 mg tablet Comments:   Reason for Stopping: MAPAP, ACETAMINOPHEN, PO Comments:   Reason for Stopping:               Diet: Cardiac Diet  Activity/Wound Care:   Patient may shower. Dry the wound carefully. The dressing can be removed if there is no drainage, or changed and kept in place for comfort. Patient should not attempt to drive a car until they are comfortable and NOT taking pain medication. No heavy lifting (7 lbs limit). Mild exercise and light duties around the house are OK. Call the office at [phone] if there are any problems.     Follow-up Information     Follow up With Details Comments Contact Info    Freddie Meier MD  3-4 weeks for staple removal.  [address] [place] [phone]            Signed:  Florian Denson NP  6/11/2018  9:22 AM

## 2018-06-11 NOTE — PROGRESS NOTES
PCS completed with all medicals and given to nursing. Nursing to call report to the [facility]- [phone]. [facility] will pick patient up at 1300pm. Nursing and patient aware. Nick Kitchen at the [facility] and informed him of time of  from [facility].

## 2018-06-11 NOTE — DISCHARGE INSTRUCTIONS
Patient may shower. Dry the wound carefully. The dressing can be removed if there is no drainage, or changed and kept in place for comfort. Patient should not attempt to drive a car until they are comfortable and NOT taking pain medication. No heavy lifting (7 lbs limit). Mild exercise and light duties around the house are OK. Call the office at [phone] if there are any problems.

## 2018-06-12 NOTE — CDMP QUERY
please clarify if this patient is (was) being treated/managed for:     => Hypokalemia in setting of K 3.2-3.4 with replacement  => Other explanation of clinical findings  => Clinically Undetermined (no explanation for clinical findings)    The medical record reflects the following clinical findings, treatment, and risk factors. Risk Factors:  presents for osteomyelitis   Clinical Indicators:K 3.4-3.2    Treatment: replacement; serial labs    Please clarify and document your clinical opinion in the progress notes and discharge summary including the definitive and/or presumptive diagnosis, (suspected or probable), related to the above clinical findings. Please include clinical findings supporting your diagnosis. dc summary. Thank Elissa Dugan  [address]; EHO;2141

## 2018-06-20 NOTE — OP NOTES
[address]  OPERATIVE REPORT    Keith Álvarez  MR#: [id number]  : 1940  ACCOUNT #: [de-identified]   DATE OF SERVICE: 2018    PREOPERATIVE DIAGNOSES:  Peripheral arterial disease with osteomyelitis of the left foot. POSTOPERATIVE DIAGNOSES:  Peripheral arterial disease with osteomyelitis of the left foot. PROCEDURE PERFORMED:  Left above knee amputation. SURGEON:  Scott Canela MD    ASSISTANT:  None. ANESTHESIA:  General.    ESTIMATED BLOOD LOSS:  100 mL    SPECIMENS REMOVED:  Left leg. DRAINS:  None. IMPLANTS:  None. COMPLICATIONS:  None. INDICATIONS PRESENT ILLNESS:  The patient is a 71-year-old man who has been wheelchair bound for many years due to muscular dystrophy. He is a longtime smoker and has severe nonreconstructable peripheral arterial disease with ischemia and osteomyelitis of the left foot. He has hip and knee contractures and presents for above knee amputation of the left leg. PROCEDURE:  After informed consent was obtained, the patient was given preoperative intravenous antibiotics within one hour of the incision. He was taken to the operating room and after induction of adequate general anesthesia, the left leg was prepped and draped as a sterile field. Of note, after induction of anesthesia, but prior to any surgical intervention, the patient developed severe hypotension requiring epinephrine and phenylephrine. He was treated by anesthesia and stabilized after a period of several minutes. After ensuring his hemodynamic stability, decision was made to proceed with the operation. A standard fishmouth incision was made in the distal left thigh, soft tissue, fascia and muscle was divided with electrocautery. The sciatic nerve and femoral vessels were divided between heavy silk ties. Periosteum of the femur was scored and elevated. The femur was transected with a Gigli saw.   The anterior edge of the femur was rounded off with a bone rasp. The stump was irrigated and confirmed to be hemostatic. The anterior and posterior flaps were approximated with interrupted heavy Vicryl suture in the fascia. A few subcutaneous sutures were placed with 2-0 Vicryl and then the skin was closed with staples. Dry dressing was applied. The patient was extubated and transferred to the PACU in stable condition. All counts were correct.       MD MILENA Pham / Moses Spatz  D: 06/20/2018 08:23     T: 06/20/2018 08:50  JOB #: [id number]

## 2018-07-23 PROBLEM — A41.9 SEPSIS (HCC): Status: ACTIVE | Noted: 2018-01-01

## 2019-12-06 NOTE — PROGRESS NOTES
Reason for Admission: Patient came to hospital for pvd and had Left AKA. He is wheelchair bound, paraplegic secondary to muscular dystrophy. He is everyday smoker. He is long term resident at the [facility]. He states he has been there for 12 years. He is able to feed himself if someone props his arms up. He is dependent on someone for other adl's. He states he does not wear oxygen. He is being discharged back to the [facility] today. Second medicare im letter given with opportunity for questions and signed copy placed on chart. He states his sister is aware he will be discharging back to the [facility] today. Referral sent to the [facility] and called Anahy Reach at the [facility] and left him a message to call back. Referral sent to [facility] and will await their response. RRAT Score:   12                    Plan for utilizing home health:      Patient will be going back to [facility] today. Likelihood of Readmission:  Low                           Transition of Care Plan:        Patient will follow up with healthcare team.                       Care Management Interventions  PCP Verified by CM:  Yes  Mode of Transport at Discharge: BLS (Patient is scheduled to be discharged back to the [facility] today. )  Transition of Care Consult (CM Consult): [address]  Discharge Durable Medical Equipment:  (Has a wheelchair at the [facility].)  Physical Therapy Consult: Yes  Occupational Therapy Consult: Yes  Current Support Network: [address] [place] [address]  Confirm Follow Up Transport: Family  Plan discussed with Pt/Family/Caregiver: Yes  Discharge Location  Discharge Placement: [address] (Patient is going back to [facility] today. ) 0 = independent

## (undated) DEVICE — ROCKER SWITCH PENCIL BLADE ELECTRODE, HOLSTER: Brand: EDGE

## (undated) DEVICE — SUT CHRMC 0 54IN REEL BRN --

## (undated) DEVICE — SURGICAL PROCEDURE PACK BASIN MAJ SET CUST NO CAUT

## (undated) DEVICE — SYR 10ML LUER LOK 1/5ML GRAD --

## (undated) DEVICE — GOWN,SIRUS,NONRNF,SETINSLV,2XL,18/CS: Brand: MEDLINE

## (undated) DEVICE — BAG RED 3PLY 2MIL 30X40 IN

## (undated) DEVICE — SUTURE VCRL SZ 2-0 L36IN ABSRB VLT L36MM CT-1 1/2 CIR J345H

## (undated) DEVICE — HANDLE LT SNAP ON ULT DURABLE LENS FOR TRUMPF ALC DISPOSABLE

## (undated) DEVICE — COVER,TABLE,60X90,STERILE: Brand: MEDLINE

## (undated) DEVICE — BNDG COHESIVE 4X5YD TAN LTX -- CONVERT TO ITEM 357347

## (undated) DEVICE — STAPLER SKIN H3.9MM WIRE DIA0.58MM CRWN 6.9MM 35 STPL ROT

## (undated) DEVICE — INTENDED FOR TISSUE SEPARATION, AND OTHER PROCEDURES THAT REQUIRE A SHARP SURGICAL BLADE TO PUNCTURE OR CUT.: Brand: BARD-PARKER ® CARBON RIB-BACK BLADES

## (undated) DEVICE — (D)PREP SKN CHLRAPRP APPL 26ML -- CONVERT TO ITEM 371833

## (undated) DEVICE — TOWEL SURG W17XL27IN STD BLU COT NONFENESTRATED PREWASHED

## (undated) DEVICE — SUTURE VCRL SZ 0 L36IN ABSRB VLT L36MM CT-1 1/2 CIR J346H

## (undated) DEVICE — STERILE POLYISOPRENE POWDER-FREE SURGICAL GLOVES: Brand: PROTEXIS

## (undated) DEVICE — DRAPE,REIN 53X77,STERILE: Brand: MEDLINE

## (undated) DEVICE — DEVON™ KNEE AND BODY STRAP 60" X 3" (1.5 M X 7.6 CM): Brand: DEVON

## (undated) DEVICE — KERLIX BANDAGE ROLL: Brand: KERLIX

## (undated) DEVICE — GAUZE SPONGES,12 PLY: Brand: CURITY

## (undated) DEVICE — INFECTION CONTROL KIT SYS

## (undated) DEVICE — NEEDLE HYPO 18GA L1.5IN PNK S STL HUB POLYPR SHLD REG BVL

## (undated) DEVICE — ABDOMINAL PAD: Brand: DERMACEA

## (undated) DEVICE — DRAPE,EXTREMITY,89X128,STERILE: Brand: MEDLINE

## (undated) DEVICE — SUTURE PERMAHAND SZ 2-0 L30IN NONABSORBABLE BLK SILK W/O A305H

## (undated) DEVICE — CONVERTORS STOCKINETTE: Brand: CONVERTORS

## (undated) DEVICE — SOLUTION IV 1000ML 0.9% SOD CHL

## (undated) DEVICE — BLADE WIRE SAW L20IN S STL REUSE GIGLI

## (undated) DEVICE — REM POLYHESIVE ADULT PATIENT RETURN ELECTRODE: Brand: VALLEYLAB